# Patient Record
Sex: FEMALE | Race: WHITE | NOT HISPANIC OR LATINO | Employment: FULL TIME | URBAN - NONMETROPOLITAN AREA
[De-identification: names, ages, dates, MRNs, and addresses within clinical notes are randomized per-mention and may not be internally consistent; named-entity substitution may affect disease eponyms.]

---

## 2017-01-24 ENCOUNTER — OFFICE VISIT (OUTPATIENT)
Dept: FAMILY MEDICINE CLINIC | Facility: CLINIC | Age: 27
End: 2017-01-24

## 2017-01-24 VITALS
WEIGHT: 249 LBS | BODY MASS INDEX: 33.72 KG/M2 | OXYGEN SATURATION: 100 % | HEART RATE: 78 BPM | TEMPERATURE: 98.3 F | SYSTOLIC BLOOD PRESSURE: 138 MMHG | DIASTOLIC BLOOD PRESSURE: 62 MMHG | HEIGHT: 72 IN | RESPIRATION RATE: 16 BRPM

## 2017-01-24 DIAGNOSIS — R53.83 FATIGUE DUE TO DEPRESSION: ICD-10-CM

## 2017-01-24 DIAGNOSIS — F41.9 ANXIETY: ICD-10-CM

## 2017-01-24 DIAGNOSIS — F32.A FATIGUE DUE TO DEPRESSION: ICD-10-CM

## 2017-01-24 DIAGNOSIS — F33.2 SEVERE EPISODE OF RECURRENT MAJOR DEPRESSIVE DISORDER, WITHOUT PSYCHOTIC FEATURES (HCC): Primary | ICD-10-CM

## 2017-01-24 DIAGNOSIS — Z72.0 TOBACCO ABUSE: ICD-10-CM

## 2017-01-24 DIAGNOSIS — Z00.00 HEALTHCARE MAINTENANCE: ICD-10-CM

## 2017-01-24 PROCEDURE — 99204 OFFICE O/P NEW MOD 45 MIN: CPT | Performed by: INTERNAL MEDICINE

## 2017-01-24 RX ORDER — VENLAFAXINE HYDROCHLORIDE 75 MG/1
75 CAPSULE, EXTENDED RELEASE ORAL DAILY
Qty: 30 CAPSULE | Refills: 2 | Status: SHIPPED | OUTPATIENT
Start: 2017-01-24 | End: 2017-04-28

## 2017-01-24 RX ORDER — HYDROXYZINE HYDROCHLORIDE 25 MG/1
25 TABLET, FILM COATED ORAL EVERY 8 HOURS PRN
Qty: 60 TABLET | Refills: 0 | Status: SHIPPED | OUTPATIENT
Start: 2017-01-24 | End: 2017-03-22 | Stop reason: SDUPTHER

## 2017-01-24 NOTE — MR AVS SNAPSHOT
Linette Hanley   1/24/2017 1:15 PM   Office Visit    Dept Phone:  [phone]   Encounter #:  [id number]    Provider:  Lit Miranda DO   Department:  [facility] PRIMARY CARE                Your Full Care Plan              Today's Medication Changes          These changes are accurate as of: 1/24/17  2:10 PM.  If you have any questions, ask your nurse or doctor.               New Medication(s)Ordered:     hydrOXYzine 25 MG tablet   Commonly known as:  ATARAX   Take 1 tablet by mouth Every 8 (Eight) Hours As Needed for itching.   Started by:  Lit Miranda DO       venlafaxine XR 75 MG 24 hr capsule   Commonly known as:  EFFEXOR XR   Take 1 capsule by mouth Daily.   Started by:  Lit Miranda DO            Where to Get Your Medications      These medications were sent to [facility] [place] [address] - [address] [place] [address] - [phone]  - [phone] [address] [address]     Phone:  [phone]     hydrOXYzine 25 MG tablet    venlafaxine XR 75 MG 24 hr capsule                  Your Updated Medication List          This list is accurate as of: 1/24/17  2:10 PM.  Always use your most recent med list.                hydrOXYzine 25 MG tablet   Commonly known as:  ATARAX   Take 1 tablet by mouth Every 8 (Eight) Hours As Needed for itching.       venlafaxine XR 75 MG 24 hr capsule   Commonly known as:  EFFEXOR XR   Take 1 capsule by mouth Daily.               We Performed the Following     CBC & Differential     Comprehensive Metabolic Panel     Lipid Panel     TSH     Vitamin B12       You Were Diagnosed With        Codes Comments    Severe episode of recurrent major depressive disorder, without psychotic features    -  Primary ICD-10-CM: F33.2  ICD-9-CM: 296.33     Anxiety     ICD-10-CM: F41.9  ICD-9-CM: 300.00     Fatigue due to depression     ICD-10-CM: F32.9, R53.83  ICD-9-CM: 311, 780.79     Healthcare  maintenance     ICD-10-CM: Z00.00  ICD-9-CM: V70.0     Tobacco abuse     ICD-10-CM: Z72.0  ICD-9-CM: 305.1       Instructions    Major Depressive Disorder  Major depressive disorder is a mental illness. It also may be called clinical depression or unipolar depression. Major depressive disorder usually causes feelings of sadness, hopelessness, or helplessness. Some people with this disorder do not feel particularly sad but lose interest in doing things they used to enjoy (anhedonia). Major depressive disorder also can cause physical symptoms. It can interfere with work, school, relationships, and other normal everyday activities. The disorder varies in severity but is longer lasting and more serious than the sadness we all feel from time to time in our lives.  Major depressive disorder often is triggered by stressful life events or major life changes. Examples of these triggers include divorce, loss of your job or home, a move, and the death of a family member or close friend. Sometimes this disorder occurs for no obvious reason at all. People who have family members with major depressive disorder or bipolar disorder are at higher risk for developing this disorder, with or without life stressors. Major depressive disorder can occur at any age. It may occur just once in your life (single episode major depressive disorder). It may occur multiple times (recurrent major depressive disorder).  SYMPTOMS  People with major depressive disorder have either anhedonia or depressed mood on nearly a daily basis for at least 2 weeks or longer. Symptoms of depressed mood include:  · Feelings of sadness (blue or down in the dumps) or emptiness.  · Feelings of hopelessness or helplessness.  · Tearfulness or episodes of crying (may be observed by others).  · Irritability (children and adolescents).  In addition to depressed mood or anhedonia or both, people with this disorder have at least four of the following symptoms:  · Difficulty  sleeping or sleeping too much.    · Significant change (increase or decrease) in appetite or weight.    · Lack of energy or motivation.  · Feelings of guilt and worthlessness.    · Difficulty concentrating, remembering, or making decisions.  · Unusually slow movement (psychomotor retardation) or restlessness (as observed by others).    · Recurrent wishes for death, recurrent thoughts of self-harm (suicide), or a suicide attempt.  People with major depressive disorder commonly have persistent negative thoughts about themselves, other people, and the world. People with severe major depressive disorder may experience distorted beliefs or perceptions about the world (psychotic delusions). They also may see or hear things that are not real (psychotic hallucinations).  DIAGNOSIS  Major depressive disorder is diagnosed through an assessment by your health care provider. Your health care provider will ask about aspects of your daily life, such as mood, sleep, and appetite, to see if you have the diagnostic symptoms of major depressive disorder. Your health care provider may ask about your medical history and use of alcohol or drugs, including prescription medicines. Your health care provider also may do a physical exam and blood work. This is because certain medical conditions and the use of certain substances can cause major depressive disorder-like symptoms (secondary depression). Your health care provider also may refer you to a mental health specialist for further evaluation and treatment.  TREATMENT  It is important to recognize the symptoms of major depressive disorder and seek treatment. The following treatments can be prescribed for this disorder:    · Medicine. Antidepressant medicines usually are prescribed. Antidepressant medicines are thought to correct chemical imbalances in the brain that are commonly associated with major depressive disorder. Other types of medicine may be added if the symptoms do not respond  to antidepressant medicines alone or if psychotic delusions or hallucinations occur.  · Talk therapy. Talk therapy can be helpful in treating major depressive disorder by providing support, education, and guidance. Certain types of talk therapy also can help with negative thinking (cognitive behavioral therapy) and with relationship issues that trigger this disorder (interpersonal therapy).  A mental health specialist can help determine which treatment is best for you. Most people with major depressive disorder do well with a combination of medicine and talk therapy. Treatments involving electrical stimulation of the brain can be used in situations with extremely severe symptoms or when medicine and talk therapy do not work over time. These treatments include electroconvulsive therapy, transcranial magnetic stimulation, and vagal nerve stimulation.     This information is not intended to replace advice given to you by your health care provider. Make sure you discuss any questions you have with your health care provider.     Document Released: 2014 Document Revised: 2016 Document Reviewed: 2014  [employer] Interactive Patient Education © [employer]       Patient Instructions History      Upcoming Appointments     Visit Type Date Time Department    NEW PATIENT 2017  1:15 PM [facility]    INITIAL EVALUATION 2017  2:00 PM [facility]    OFFICE VISIT 2017  3:15 PM [facility]      B[employer] Signup     [facility] [employer] allows you to send messages to your doctor, view your test results, renew your prescriptions, schedule appointments, and more. To sign up, go to [email or web address] and click on the Sign Up Now link in the New User? box. Enter your [employer] Activation Code exactly as it appears below along with the last four digits of your Social Security Number and your Date of Birth () to complete the sign-up process. If you do not sign up before the  "expiration date, you must request a new code.    MyChart Activation Code: [email or web address]  Expires: 2/7/2017  2:08 PM    If you have questions, you can email [email or web address] or call [phone] to talk to our MyChart staff. Remember, MyChart is NOT to be used for urgent needs. For medical emergencies, dial 911.               Other Info from Your Visit           Your Appointments     Jan 31, 2017  2:00 PM EST   Initial Evaluation with Dorys Benitez, [facility] (--)    [address]  [facility] [address] [phone]           Bring all previous medical records and films, along with current medications and insurance information.            Feb 23, 2017  3:15 PM EST   Office Visit with Lit Miranda DO   [facility] (--)    [address]  [facility] [address] [phone]           Arrive 15 minutes prior to appointment.              Allergies     No Known Allergies      Reason for Visit     Establish Care Patient is here to establish care. Patient states she suffers from bipolar depression. She use to see therapist and take medication. Patient is fasting for labs.       Vital Signs     Blood Pressure Pulse Temperature Respirations Height Weight    138/62 (BP Location: Left arm, Patient Position: Sitting, Cuff Size: Adult) 78 98.3 °F (36.8 °C) (Oral) 16 72\" (182.9 cm) 249 lb (113 kg)    Oxygen Saturation Body Mass Index Smoking Status             100% 33.77 kg/m2 Current Every Day Smoker         Problems and Diagnoses Noted     Anxiety problem    Mood problem    Tobacco abuse    Fatigue due to depression        Routine medical exam            "

## 2017-01-24 NOTE — PROGRESS NOTES
Chief Complaint   Patient presents with   • Establish Care     Patient is here to establish care. Patient states she suffers from bipolar depression. She use to see therapist and take medication. Patient is fasting for labs.        Subjective     History of Present Illness   Linette Hanley is a 26 y.o. female with a history of bipolar, depression, tobacco abuse, and obesity who presents to establish care.  Patient states that she has recently had an emergency room visit for suicidal attempt and thoughts.  She was discharged from the emergency room after evaluation by psychology and was offered outpatient therapy.  Patient has an appointment with psychology but needs to establish with a primary care physician.  Patient shares that she used to be on anxiety medications in the past however she has been lost to medical care over the last 3-4 years.  She shares that her anxiety, depression, and behavior are worse recently.  She is currently living with her girlfriend who has been very supportive.    Patient admits to smoking a pack per day since since young teenage years.    The following portions of the patient's history were reviewed and updated as appropriate: allergies, current medications, past family history, past medical history, past social history, past surgical history and problem list.    Review of Systems   Constitutional: Negative for chills, fatigue and fever.   HENT: Negative for congestion, ear pain, rhinorrhea, sinus pressure and sore throat.    Eyes: Negative for visual disturbance.   Respiratory: Negative for cough, chest tightness, shortness of breath and wheezing.    Cardiovascular: Negative for chest pain, palpitations and leg swelling.   Gastrointestinal: Negative for abdominal pain, blood in stool, constipation, diarrhea, nausea and vomiting.   Endocrine: Negative for polydipsia and polyuria.   Genitourinary: Negative for dysuria and hematuria.   Musculoskeletal: Negative for back pain.    Skin: Negative for rash.   Neurological: Negative for dizziness, light-headedness, numbness and headaches.   Psychiatric/Behavioral: Positive for decreased concentration, dysphoric mood and sleep disturbance. Negative for self-injury and suicidal ideas. The patient is nervous/anxious.        No Known Allergies    Past Medical History   Diagnosis Date   • Asthma    • Bipolar depression    • Hypertension        Social History     Social History   • Marital status: Single     Spouse name: N/A   • Number of children: N/A   • Years of education: N/A     Occupational History   • Not on file.     Social History Main Topics   • Smoking status: Current Every Day Smoker   • Smokeless tobacco: Not on file   • Alcohol use No   • Drug use: No   • Sexual activity: Not on file     Other Topics Concern   • Not on file     Social History Narrative   • No narrative on file        No past surgical history on file.    Family History   Problem Relation Age of Onset   • Cancer Mother    • Hyperlipidemia Mother    • Hypertension Mother    • Obesity Mother    • Stroke Father    • Hypertension Father    • Heart disease Father    • Depression Sister    • Depression Brother    • Cancer Maternal Grandmother    • Hyperlipidemia Maternal Grandmother    • Hypertension Maternal Grandmother    • Obesity Maternal Grandmother    • Cancer Maternal Grandfather    • Hyperlipidemia Maternal Grandfather    • Hypertension Maternal Grandfather    • Obesity Maternal Grandfather    • Depression Sister    • Depression Sister    • Depression Brother    • Depression Brother          Current Outpatient Prescriptions:   •  hydrOXYzine (ATARAX) 25 MG tablet, Take 1 tablet by mouth Every 8 (Eight) Hours As Needed for itching., Disp: 60 tablet, Rfl: 0  •  venlafaxine XR (EFFEXOR XR) 75 MG 24 hr capsule, Take 1 capsule by mouth Daily., Disp: 30 capsule, Rfl: 2    Objective   Visit Vitals   • /62 (BP Location: Left arm, Patient Position: Sitting, Cuff Size:  "Adult)   • Pulse 78   • Temp 98.3 °F (36.8 °C) (Oral)   • Resp 16   • Ht 72\" (182.9 cm)   • Wt 249 lb (113 kg)   • SpO2 100%   • BMI 33.77 kg/m2       Physical Exam   Constitutional: She is oriented to person, place, and time. She appears well-nourished. No distress.   HENT:   Head: Atraumatic.   Right Ear: External ear normal.   Left Ear: External ear normal.   Eyes: Conjunctivae are normal. Right eye exhibits no discharge. Left eye exhibits no discharge.   Neck: Normal range of motion. Neck supple.   Cardiovascular: Normal rate and regular rhythm.    No murmur heard.  Pulmonary/Chest: Effort normal and breath sounds normal. She has no wheezes. She has no rales.   Abdominal: Soft. Bowel sounds are normal. She exhibits no distension. There is no tenderness.   Neurological: She is alert and oriented to person, place, and time.   Skin: No rash noted. She is not diaphoretic.   Numerous long scar from cuts in the past noted on her upper extremities, upper chest and back.   Psychiatric: She has a normal mood and affect.   Nursing note and vitals reviewed.      Assessment/Plan   Linette was seen today for [facility].    Diagnoses and all orders for this visit:    Severe episode of recurrent major depressive disorder, without psychotic features  -     venlafaxine XR (EFFEXOR XR) 75 MG 24 hr capsule; Take 1 capsule by mouth Daily.  -     TSH    Anxiety  -     hydrOXYzine (ATARAX) 25 MG tablet; Take 1 tablet by mouth Every 8 (Eight) Hours As Needed for itching.    Fatigue due to depression  -     CBC & Differential  -     TSH  -     Vitamin D 25 Hydroxy; Future  -     Vitamin B12    Healthcare maintenance  -     Comprehensive Metabolic Panel  -     Lipid Panel    Tobacco abuse        Discussion Summary:  26-year-old -American female presenting to establish care.    1.  Major depressive disorder  -Start Effexor,  risks and benefits of the medications were discussed.  Patient was agreeable to starting the medication " she understands that should she become suicidal then she should present to the emergency room immediately.  -Check TSH  -Recommended that the patient sees a psychologist to help with some of her symptoms.  She has had suicidal thoughts and actions in the past.  She has multiple scars on her back, chest, and arms from suicidal attempts.    2.  Fatigue -check labs per above.    3.  Tobacco abuse-we will address further when anxiety and depression are better control.  Follow up:  Return in about 1 month (around 2/24/2017) for Next scheduled follow up.     Patient Instructions:  Patient instructions were provided.

## 2017-01-24 NOTE — PATIENT INSTRUCTIONS
Major Depressive Disorder  Major depressive disorder is a mental illness. It also may be called clinical depression or unipolar depression. Major depressive disorder usually causes feelings of sadness, hopelessness, or helplessness. Some people with this disorder do not feel particularly sad but lose interest in doing things they used to enjoy (anhedonia). Major depressive disorder also can cause physical symptoms. It can interfere with work, school, relationships, and other normal everyday activities. The disorder varies in severity but is longer lasting and more serious than the sadness we all feel from time to time in our lives.  Major depressive disorder often is triggered by stressful life events or major life changes. Examples of these triggers include divorce, loss of your job or home, a move, and the death of a family member or close friend. Sometimes this disorder occurs for no obvious reason at all. People who have family members with major depressive disorder or bipolar disorder are at higher risk for developing this disorder, with or without life stressors. Major depressive disorder can occur at any age. It may occur just once in your life (single episode major depressive disorder). It may occur multiple times (recurrent major depressive disorder).  SYMPTOMS  People with major depressive disorder have either anhedonia or depressed mood on nearly a daily basis for at least 2 weeks or longer. Symptoms of depressed mood include:  · Feelings of sadness (blue or down in the dumps) or emptiness.  · Feelings of hopelessness or helplessness.  · Tearfulness or episodes of crying (may be observed by others).  · Irritability (children and adolescents).  In addition to depressed mood or anhedonia or both, people with this disorder have at least four of the following symptoms:  · Difficulty sleeping or sleeping too much.    · Significant change (increase or decrease) in appetite or weight.    · Lack of energy or  motivation.  · Feelings of guilt and worthlessness.    · Difficulty concentrating, remembering, or making decisions.  · Unusually slow movement (psychomotor retardation) or restlessness (as observed by others).    · Recurrent wishes for death, recurrent thoughts of self-harm (suicide), or a suicide attempt.  People with major depressive disorder commonly have persistent negative thoughts about themselves, other people, and the world. People with severe major depressive disorder may experience distorted beliefs or perceptions about the world (psychotic delusions). They also may see or hear things that are not real (psychotic hallucinations).  DIAGNOSIS  Major depressive disorder is diagnosed through an assessment by your health care provider. Your health care provider will ask about aspects of your daily life, such as mood, sleep, and appetite, to see if you have the diagnostic symptoms of major depressive disorder. Your health care provider may ask about your medical history and use of alcohol or drugs, including prescription medicines. Your health care provider also may do a physical exam and blood work. This is because certain medical conditions and the use of certain substances can cause major depressive disorder-like symptoms (secondary depression). Your health care provider also may refer you to a mental health specialist for further evaluation and treatment.  TREATMENT  It is important to recognize the symptoms of major depressive disorder and seek treatment. The following treatments can be prescribed for this disorder:    · Medicine. Antidepressant medicines usually are prescribed. Antidepressant medicines are thought to correct chemical imbalances in the brain that are commonly associated with major depressive disorder. Other types of medicine may be added if the symptoms do not respond to antidepressant medicines alone or if psychotic delusions or hallucinations occur.  · Talk therapy. Talk therapy can be  helpful in treating major depressive disorder by providing support, education, and guidance. Certain types of talk therapy also can help with negative thinking (cognitive behavioral therapy) and with relationship issues that trigger this disorder (interpersonal therapy).  A mental health specialist can help determine which treatment is best for you. Most people with major depressive disorder do well with a combination of medicine and talk therapy. Treatments involving electrical stimulation of the brain can be used in situations with extremely severe symptoms or when medicine and talk therapy do not work over time. These treatments include electroconvulsive therapy, transcranial magnetic stimulation, and vagal nerve stimulation.     This information is not intended to replace advice given to you by your health care provider. Make sure you discuss any questions you have with your health care provider.     Document Released: 04/14/2014 Document Revised: 01/08/2016 Document Reviewed: 04/14/2014  [employer] Interactive Patient Education ©2016 [employer].

## 2017-01-27 ENCOUNTER — OFFICE VISIT (OUTPATIENT)
Dept: FAMILY MEDICINE CLINIC | Facility: CLINIC | Age: 27
End: 2017-01-27

## 2017-01-27 VITALS
HEART RATE: 84 BPM | HEIGHT: 72 IN | SYSTOLIC BLOOD PRESSURE: 129 MMHG | TEMPERATURE: 98.3 F | WEIGHT: 239 LBS | OXYGEN SATURATION: 99 % | DIASTOLIC BLOOD PRESSURE: 92 MMHG | RESPIRATION RATE: 14 BRPM | BODY MASS INDEX: 32.37 KG/M2

## 2017-01-27 DIAGNOSIS — H65.91 OTHER NONSUPPURATIVE OTITIS MEDIA OF RIGHT EAR, UNSPECIFIED CHRONICITY: Primary | ICD-10-CM

## 2017-01-27 PROCEDURE — 99213 OFFICE O/P EST LOW 20 MIN: CPT | Performed by: INTERNAL MEDICINE

## 2017-01-27 RX ORDER — AMOXICILLIN 500 MG/1
500 CAPSULE ORAL 2 TIMES DAILY
Qty: 14 CAPSULE | Refills: 0 | Status: SHIPPED | OUTPATIENT
Start: 2017-01-27 | End: 2017-03-22

## 2017-01-27 NOTE — MR AVS SNAPSHOT
Linette Hanley   1/27/2017 1:45 PM   Office Visit    Dept Phone:  [phone]   Encounter #:  [id number]    Provider:  Lit Miranda DO   Department:  [facility] PRIMARY CARE                Your Full Care Plan              Today's Medication Changes          These changes are accurate as of: 1/27/17  2:39 PM.  If you have any questions, ask your nurse or doctor.               New Medication(s)Ordered:     amoxicillin 500 MG capsule   Commonly known as:  AMOXIL   Take 1 capsule by mouth 2 (Two) Times a Day.            Where to Get Your Medications      These medications were sent to [facility] [place] [address] [address] - [phone]  - [phone] [address] [address]     Phone:  [phone]     amoxicillin 500 MG capsule                  Your Updated Medication List          This list is accurate as of: 1/27/17  2:39 PM.  Always use your most recent med list.                amoxicillin 500 MG capsule   Commonly known as:  AMOXIL   Take 1 capsule by mouth 2 (Two) Times a Day.       hydrOXYzine 25 MG tablet   Commonly known as:  ATARAX   Take 1 tablet by mouth Every 8 (Eight) Hours As Needed for itching.       venlafaxine XR 75 MG 24 hr capsule   Commonly known as:  EFFEXOR XR   Take 1 capsule by mouth Daily.               You Were Diagnosed With        Codes Comments    Other nonsuppurative otitis media of right ear, unspecified chronicity    -  Primary ICD-10-CM: H65.91  ICD-9-CM: 381.4       Instructions     None    Patient Instructions History      Upcoming Appointments     Visit Type Date Time Department    OFFICE VISIT 1/27/2017  1:45 PM [facility]    INITIAL EVALUATION 1/31/2017  2:00 PM [facility]    OFFICE VISIT 2/23/2017  3:15 PM [facility]      MyChart Signup     [facility] allows you to send messages to your doctor, view your test results, renew your  "prescriptions, schedule appointments, and more. To sign up, go to [employer] and click on the Sign Up Now link in the New User? box. Enter your mig33 Activation Code exactly as it appears below along with the last four digits of your Social Security Number and your Date of Birth () to complete the sign-up process. If you do not sign up before the expiration date, you must request a new code.    [email or web address] Activation Code: [email or web address]  Expires: 2017  2:08 PM    If you have questions, you can email [email or web address] or call [phone] to talk to our [email or web address] staff. Remember, mig33 is NOT to be used for urgent needs. For medical emergencies, dial 911.               Other Info from Your Visit           Your Appointments     2017  2:00 PM EST   Initial Evaluation with [facility] (--)    [address]  [facility] [address] [phone]           Bring all previous medical records and films, along with current medications and insurance information.            2017  3:15 PM EST   Office Visit with Lit Miranda DO   [facility] PRIMARY [facility] (--)    [address]  [facility] [address] [phone]           Arrive 15 minutes prior to appointment.              Allergies     No Known Allergies      Reason for Visit     Follow-up Patient is here to be seen for right ear pain, diarrhea, and nasal congestion x 2 days     Earache Right ear pain      Vital Signs     Blood Pressure Pulse Temperature Respirations Height Weight    129/92 (BP Location: Right arm, Patient Position: Sitting, Cuff Size: Large Adult) 84 98.3 °F (36.8 °C) (Oral) 14 72\" (182.9 cm) 239 lb (108 kg)    Oxygen Saturation Body Mass Index Smoking Status             99% 32.41 kg/m2 Current Every Day Smoker         Problems and Diagnoses Noted     Other nonsuppurative otitis media of right " ear, unspecified chronicity    -  Primary

## 2017-01-27 NOTE — LETTER
January 27, 2017     Patient: Linette Hanley   YOB: 1990   Date of Visit: 1/27/2017       To Whom It May Concern:    It is my medical opinion that Linette Hanley may return to work in two days.         Sincerely,        Lit Miranda DO    CC: No Recipients

## 2017-01-27 NOTE — PROGRESS NOTES
Chief Complaint   Patient presents with   • Follow-up     Patient is here to be seen for right ear pain, diarrhea, and nasal congestion x 2 days    • Earache     Right ear pain       Subjective     History of Present Illness   Linette Hanley is a 26 y.o. female presenting with right ear pain x 2-3 days with associated chills, congestion, and generalized weakness.     The following portions of the patient's history were reviewed and updated as appropriate: allergies, current medications, past family history, past medical history, past social history, past surgical history and problem list.    Review of Systems    No Known Allergies    Past Medical History   Diagnosis Date   • Asthma    • Bipolar depression    • Hypertension        Social History     Social History   • Marital status: Single     Spouse name: N/A   • Number of children: N/A   • Years of education: N/A     Occupational History   • Not on file.     Social History Main Topics   • Smoking status: Current Every Day Smoker   • Smokeless tobacco: Not on file   • Alcohol use No   • Drug use: No   • Sexual activity: Not on file     Other Topics Concern   • Not on file     Social History Narrative   • No narrative on file        No past surgical history on file.    Family History   Problem Relation Age of Onset   • Cancer Mother    • Hyperlipidemia Mother    • Hypertension Mother    • Obesity Mother    • Stroke Father    • Hypertension Father    • Heart disease Father    • Depression Sister    • Depression Brother    • Cancer Maternal Grandmother    • Hyperlipidemia Maternal Grandmother    • Hypertension Maternal Grandmother    • Obesity Maternal Grandmother    • Cancer Maternal Grandfather    • Hyperlipidemia Maternal Grandfather    • Hypertension Maternal Grandfather    • Obesity Maternal Grandfather    • Depression Sister    • Depression Sister    • Depression Brother    • Depression Brother          Current Outpatient Prescriptions:   •  hydrOXYzine  "(ATARAX) 25 MG tablet, Take 1 tablet by mouth Every 8 (Eight) Hours As Needed for itching., Disp: 60 tablet, Rfl: 0  •  venlafaxine XR (EFFEXOR XR) 75 MG 24 hr capsule, Take 1 capsule by mouth Daily., Disp: 30 capsule, Rfl: 2  •  amoxicillin (AMOXIL) 500 MG capsule, Take 1 capsule by mouth 2 (Two) Times a Day., Disp: 14 capsule, Rfl: 0    Objective   Visit Vitals   • /92 (BP Location: Right arm, Patient Position: Sitting, Cuff Size: Large Adult)   • Pulse 84   • Temp 98.3 °F (36.8 °C) (Oral)   • Resp 14   • Ht 72\" (182.9 cm)   • Wt 239 lb (108 kg)   • SpO2 99%   • BMI 32.41 kg/m2       Physical Exam   Constitutional: She is oriented to person, place, and time. She appears well-developed and well-nourished.   HENT:   Head: Normocephalic and atraumatic.   Mild erythema in throat, Right ear with inflammation of TM and fibrous deformity.   Eyes: Conjunctivae are normal.   Pulmonary/Chest: Effort normal.   Musculoskeletal: Normal range of motion.   Neurological: She is alert and oriented to person, place, and time.   Psychiatric: She has a normal mood and affect. Her behavior is normal.   Nursing note and vitals reviewed.      Assessment/Plan   Linette was seen today for follow-up and earache.    Diagnoses and all orders for this visit:    Other nonsuppurative otitis media of right ear, unspecified chronicity  -     amoxicillin (AMOXIL) 500 MG capsule; Take 1 capsule by mouth 2 (Two) Times a Day.          Discussion Summary:    25 yo AA F  Presenting with right otitis media.  Start amoxicillin.  OTC conservative therapies recommended.  Pt given letter for 2 days off work.    Follow up:  Return if symptoms worsen or fail to improve.     Patient Instructions:  Patient instructions were provided.  "

## 2017-02-24 ENCOUNTER — APPOINTMENT (OUTPATIENT)
Dept: GENERAL RADIOLOGY | Facility: HOSPITAL | Age: 27
End: 2017-02-24

## 2017-02-24 ENCOUNTER — HOSPITAL ENCOUNTER (EMERGENCY)
Facility: HOSPITAL | Age: 27
Discharge: HOME | End: 2017-02-25
Attending: EMERGENCY MEDICINE | Admitting: EMERGENCY MEDICINE

## 2017-02-24 DIAGNOSIS — S39.012A LUMBAR STRAIN, INITIAL ENCOUNTER: Primary | ICD-10-CM

## 2017-02-24 PROCEDURE — 81001 URINALYSIS AUTO W/SCOPE: CPT | Performed by: EMERGENCY MEDICINE

## 2017-02-24 PROCEDURE — 99283 EMERGENCY DEPT VISIT LOW MDM: CPT

## 2017-02-24 PROCEDURE — 25010000002 KETOROLAC TROMETHAMINE PER 15 MG: Performed by: EMERGENCY MEDICINE

## 2017-02-24 PROCEDURE — 96372 THER/PROPH/DIAG INJ SC/IM: CPT

## 2017-02-24 PROCEDURE — 72110 X-RAY EXAM L-2 SPINE 4/>VWS: CPT

## 2017-02-24 RX ORDER — KETOROLAC TROMETHAMINE 30 MG/ML
60 INJECTION, SOLUTION INTRAMUSCULAR; INTRAVENOUS ONCE
Status: COMPLETED | OUTPATIENT
Start: 2017-02-24 | End: 2017-02-24

## 2017-02-24 RX ADMIN — KETOROLAC TROMETHAMINE 60 MG: 30 INJECTION, SOLUTION INTRAMUSCULAR at 23:54

## 2017-02-25 VITALS
HEIGHT: 72 IN | WEIGHT: 245 LBS | HEART RATE: 91 BPM | BODY MASS INDEX: 33.18 KG/M2 | RESPIRATION RATE: 18 BRPM | OXYGEN SATURATION: 100 % | SYSTOLIC BLOOD PRESSURE: 116 MMHG | DIASTOLIC BLOOD PRESSURE: 63 MMHG | TEMPERATURE: 98.7 F

## 2017-02-25 LAB
BACTERIA UR QL AUTO: ABNORMAL /HPF
BILIRUB UR QL STRIP: NEGATIVE
CLARITY UR: CLEAR
COLOR UR: YELLOW
GLUCOSE UR STRIP-MCNC: NEGATIVE MG/DL
HGB UR QL STRIP.AUTO: NEGATIVE
HYALINE CASTS UR QL AUTO: ABNORMAL /LPF
KETONES UR QL STRIP: NEGATIVE
LEUKOCYTE ESTERASE UR QL STRIP.AUTO: ABNORMAL
NITRITE UR QL STRIP: NEGATIVE
PH UR STRIP.AUTO: 5.5 [PH] (ref 5–8)
PROT UR QL STRIP: NEGATIVE
RBC # UR: ABNORMAL /HPF
REF LAB TEST METHOD: ABNORMAL
SP GR UR STRIP: 1.01 (ref 1–1.03)
SQUAMOUS #/AREA URNS HPF: ABNORMAL /HPF
UROBILINOGEN UR QL STRIP: ABNORMAL
WBC UR QL AUTO: ABNORMAL /HPF

## 2017-02-25 RX ORDER — IBUPROFEN 600 MG/1
600 TABLET ORAL EVERY 6 HOURS PRN
Qty: 28 TABLET | Refills: 0 | Status: SHIPPED | OUTPATIENT
Start: 2017-02-25 | End: 2019-11-13

## 2017-03-22 ENCOUNTER — OFFICE VISIT (OUTPATIENT)
Dept: FAMILY MEDICINE CLINIC | Facility: CLINIC | Age: 27
End: 2017-03-22

## 2017-03-22 VITALS
HEART RATE: 94 BPM | TEMPERATURE: 98 F | DIASTOLIC BLOOD PRESSURE: 62 MMHG | OXYGEN SATURATION: 100 % | WEIGHT: 241 LBS | SYSTOLIC BLOOD PRESSURE: 130 MMHG | HEIGHT: 72 IN | RESPIRATION RATE: 16 BRPM | BODY MASS INDEX: 32.64 KG/M2

## 2017-03-22 DIAGNOSIS — F41.9 ANXIETY: ICD-10-CM

## 2017-03-22 DIAGNOSIS — F33.2 SEVERE EPISODE OF RECURRENT MAJOR DEPRESSIVE DISORDER, WITHOUT PSYCHOTIC FEATURES (HCC): Primary | ICD-10-CM

## 2017-03-22 PROCEDURE — 99214 OFFICE O/P EST MOD 30 MIN: CPT | Performed by: INTERNAL MEDICINE

## 2017-03-22 RX ORDER — HYDROXYZINE HYDROCHLORIDE 25 MG/1
25 TABLET, FILM COATED ORAL EVERY 8 HOURS PRN
Qty: 60 TABLET | Refills: 2 | Status: SHIPPED | OUTPATIENT
Start: 2017-03-22 | End: 2017-07-12 | Stop reason: SDUPTHER

## 2017-03-22 RX ORDER — BUPROPION HYDROCHLORIDE 150 MG/1
150 TABLET ORAL DAILY
Qty: 30 TABLET | Refills: 2 | Status: SHIPPED | OUTPATIENT
Start: 2017-03-22 | End: 2017-04-28 | Stop reason: SDUPTHER

## 2017-03-22 NOTE — PROGRESS NOTES
Chief Complaint   Patient presents with   • Follow-up     Patient is here to follow up for anxiety and depression. She states she can not tell that the medication is helping her. Patient states she has been picking her skin and have nightmares.        Subjective     History of Present Illness   Linette Hanley is a 26 y.o. female with h/o depression and anxiety presenting for follow up on medical problems.  She feels her meds have not made a major difference.  She has been picking at her finger nails habitually more often.  She still has anger outbursts.  She is not suicidal at this time.      The following portions of the patient's history were reviewed and updated as appropriate: allergies, current medications, past family history, past medical history, past social history, past surgical history and problem list.    Review of Systems   Constitutional: Negative for chills, fatigue and fever.   HENT: Negative for congestion, ear pain, rhinorrhea, sinus pressure and sore throat.    Eyes: Negative for visual disturbance.   Respiratory: Negative for cough, chest tightness, shortness of breath and wheezing.    Cardiovascular: Negative for chest pain, palpitations and leg swelling.   Gastrointestinal: Negative for abdominal pain, blood in stool, constipation, diarrhea, nausea and vomiting.   Endocrine: Negative for polydipsia and polyuria.   Genitourinary: Negative for dysuria and hematuria.   Musculoskeletal: Negative for back pain.   Skin: Negative for rash.   Neurological: Negative for dizziness, light-headedness, numbness and headaches.   Psychiatric/Behavioral: Negative for dysphoric mood and sleep disturbance. The patient is not nervous/anxious.        No Known Allergies    Past Medical History:   Diagnosis Date   • Asthma    • Bipolar depression    • Hypertension        Social History     Social History   • Marital status: Single     Spouse name: N/A   • Number of children: N/A   • Years of education: N/A  "    Occupational History   • Not on file.     Social History Main Topics   • Smoking status: Current Every Day Smoker   • Smokeless tobacco: Not on file   • Alcohol use No   • Drug use: No   • Sexual activity: Not on file     Other Topics Concern   • Not on file     Social History Narrative        No past surgical history on file.    Family History   Problem Relation Age of Onset   • Cancer Mother    • Hyperlipidemia Mother    • Hypertension Mother    • Obesity Mother    • Stroke Father    • Hypertension Father    • Heart disease Father    • Depression Sister    • Depression Brother    • Cancer Maternal Grandmother    • Hyperlipidemia Maternal Grandmother    • Hypertension Maternal Grandmother    • Obesity Maternal Grandmother    • Cancer Maternal Grandfather    • Hyperlipidemia Maternal Grandfather    • Hypertension Maternal Grandfather    • Obesity Maternal Grandfather    • Depression Sister    • Depression Sister    • Depression Brother    • Depression Brother          Current Outpatient Prescriptions:   •  hydrOXYzine (ATARAX) 25 MG tablet, Take 1 tablet by mouth Every 8 (Eight) Hours As Needed for Itching., Disp: 60 tablet, Rfl: 2  •  ibuprofen (ADVIL,MOTRIN) 600 MG tablet, Take 1 tablet by mouth Every 6 (Six) Hours As Needed for mild pain (1-3)., Disp: 28 tablet, Rfl: 0  •  venlafaxine XR (EFFEXOR XR) 75 MG 24 hr capsule, Take 1 capsule by mouth Daily., Disp: 30 capsule, Rfl: 2  •  buPROPion XL (WELLBUTRIN XL) 150 MG 24 hr tablet, Take 1 tablet by mouth Daily., Disp: 30 tablet, Rfl: 2    Objective   /62 (BP Location: Right arm, Patient Position: Sitting, Cuff Size: Adult)  Pulse 94  Temp 98 °F (36.7 °C) (Oral)   Resp 16  Ht 72\" (182.9 cm)  Wt 241 lb (109 kg)  LMP 01/25/2017  SpO2 100%  BMI 32.69 kg/m2    Physical Exam   Constitutional: She is oriented to person, place, and time. She appears well-developed and well-nourished.   HENT:   Head: Normocephalic and atraumatic.   Eyes: Conjunctivae are " normal.   Pulmonary/Chest: Effort normal.   Musculoskeletal: Normal range of motion.   Neurological: She is alert and oriented to person, place, and time.   Psychiatric: She has a normal mood and affect. Her behavior is normal.   Nursing note and vitals reviewed.      Assessment/Plan   Linette was seen today for follow-up.    Diagnoses and all orders for this visit:    Severe episode of recurrent major depressive disorder, without psychotic features  -     buPROPion XL (WELLBUTRIN XL) 150 MG 24 hr tablet; Take 1 tablet by mouth Daily.    Anxiety  -     buPROPion XL (WELLBUTRIN XL) 150 MG 24 hr tablet; Take 1 tablet by mouth Daily.  -     hydrOXYzine (ATARAX) 25 MG tablet; Take 1 tablet by mouth Every 8 (Eight) Hours As Needed for Itching.        Discussion Summary:  27 yo W F presenting for follow up on anxiety/depression  Patient to wean off Effexor over 1 week and start Wellbutrin for mood.  Hydroxyzine for anxiety attacks.  Patient advised to see counselor which she hesitated with over the last month.  Extensive time was spent with counseling and encouraging the patient to think more positively with life.      Follow up:  Return in about 1 month (around 4/22/2017) for Next scheduled follow up.     Patient Instructions:  Patient instructions were provided.

## 2017-03-22 NOTE — PATIENT INSTRUCTIONS
Major Depressive Disorder  Major depressive disorder is a mental illness. It also may be called clinical depression or unipolar depression. Major depressive disorder usually causes feelings of sadness, hopelessness, or helplessness. Some people with this disorder do not feel particularly sad but lose interest in doing things they used to enjoy (anhedonia). Major depressive disorder also can cause physical symptoms. It can interfere with work, school, relationships, and other normal everyday activities. The disorder varies in severity but is longer lasting and more serious than the sadness we all feel from time to time in our lives.  Major depressive disorder often is triggered by stressful life events or major life changes. Examples of these triggers include divorce, loss of your job or home, a move, and the death of a family member or close friend. Sometimes this disorder occurs for no obvious reason at all. People who have family members with major depressive disorder or bipolar disorder are at higher risk for developing this disorder, with or without life stressors. Major depressive disorder can occur at any age. It may occur just once in your life (single episode major depressive disorder). It may occur multiple times (recurrent major depressive disorder).  SYMPTOMS  People with major depressive disorder have either anhedonia or depressed mood on nearly a daily basis for at least 2 weeks or longer. Symptoms of depressed mood include:  · Feelings of sadness (blue or down in the dumps) or emptiness.  · Feelings of hopelessness or helplessness.  · Tearfulness or episodes of crying (may be observed by others).  · Irritability (children and adolescents).  In addition to depressed mood or anhedonia or both, people with this disorder have at least four of the following symptoms:  · Difficulty sleeping or sleeping too much.    · Significant change (increase or decrease) in appetite or weight.    · Lack of energy or  motivation.  · Feelings of guilt and worthlessness.    · Difficulty concentrating, remembering, or making decisions.  · Unusually slow movement (psychomotor retardation) or restlessness (as observed by others).    · Recurrent wishes for death, recurrent thoughts of self-harm (suicide), or a suicide attempt.  People with major depressive disorder commonly have persistent negative thoughts about themselves, other people, and the world. People with severe major depressive disorder may experience distorted beliefs or perceptions about the world (psychotic delusions). They also may see or hear things that are not real (psychotic hallucinations).  DIAGNOSIS  Major depressive disorder is diagnosed through an assessment by your health care provider. Your health care provider will ask about aspects of your daily life, such as mood, sleep, and appetite, to see if you have the diagnostic symptoms of major depressive disorder. Your health care provider may ask about your medical history and use of alcohol or drugs, including prescription medicines. Your health care provider also may do a physical exam and blood work. This is because certain medical conditions and the use of certain substances can cause major depressive disorder-like symptoms (secondary depression). Your health care provider also may refer you to a mental health specialist for further evaluation and treatment.  TREATMENT  It is important to recognize the symptoms of major depressive disorder and seek treatment. The following treatments can be prescribed for this disorder:    · Medicine. Antidepressant medicines usually are prescribed. Antidepressant medicines are thought to correct chemical imbalances in the brain that are commonly associated with major depressive disorder. Other types of medicine may be added if the symptoms do not respond to antidepressant medicines alone or if psychotic delusions or hallucinations occur.  · Talk therapy. Talk therapy can be  helpful in treating major depressive disorder by providing support, education, and guidance. Certain types of talk therapy also can help with negative thinking (cognitive behavioral therapy) and with relationship issues that trigger this disorder (interpersonal therapy).  A mental health specialist can help determine which treatment is best for you. Most people with major depressive disorder do well with a combination of medicine and talk therapy. Treatments involving electrical stimulation of the brain can be used in situations with extremely severe symptoms or when medicine and talk therapy do not work over time. These treatments include electroconvulsive therapy, transcranial magnetic stimulation, and vagal nerve stimulation.     This information is not intended to replace advice given to you by your health care provider. Make sure you discuss any questions you have with your health care provider.     Document Released: 04/14/2014 Document Revised: 01/08/2016 Document Reviewed: 04/14/2014  [employer] Interactive Patient Education ©2016 [employer]

## 2017-04-15 DIAGNOSIS — H65.91 OTHER NONSUPPURATIVE OTITIS MEDIA OF RIGHT EAR, UNSPECIFIED CHRONICITY: ICD-10-CM

## 2017-04-18 RX ORDER — AMOXICILLIN 500 MG/1
CAPSULE ORAL
Qty: 14 CAPSULE | Refills: 0 | OUTPATIENT
Start: 2017-04-18

## 2017-04-28 ENCOUNTER — OFFICE VISIT (OUTPATIENT)
Dept: FAMILY MEDICINE CLINIC | Facility: CLINIC | Age: 27
End: 2017-04-28

## 2017-04-28 VITALS
WEIGHT: 244 LBS | RESPIRATION RATE: 15 BRPM | BODY MASS INDEX: 33.05 KG/M2 | HEART RATE: 65 BPM | DIASTOLIC BLOOD PRESSURE: 86 MMHG | HEIGHT: 72 IN | SYSTOLIC BLOOD PRESSURE: 130 MMHG | OXYGEN SATURATION: 100 %

## 2017-04-28 DIAGNOSIS — F33.2 SEVERE EPISODE OF RECURRENT MAJOR DEPRESSIVE DISORDER, WITHOUT PSYCHOTIC FEATURES (HCC): ICD-10-CM

## 2017-04-28 DIAGNOSIS — S33.5XXD LOW BACK SPRAIN, SUBSEQUENT ENCOUNTER: Primary | ICD-10-CM

## 2017-04-28 DIAGNOSIS — F41.9 ANXIETY: ICD-10-CM

## 2017-04-28 PROCEDURE — 99213 OFFICE O/P EST LOW 20 MIN: CPT | Performed by: INTERNAL MEDICINE

## 2017-04-28 RX ORDER — CYCLOBENZAPRINE HCL 10 MG
TABLET ORAL
COMMUNITY
Start: 2017-02-27 | End: 2017-04-28 | Stop reason: SDUPTHER

## 2017-04-28 RX ORDER — CYCLOBENZAPRINE HCL 10 MG
10 TABLET ORAL 2 TIMES DAILY PRN
Qty: 30 TABLET | Refills: 2 | Status: SHIPPED | OUTPATIENT
Start: 2017-04-28 | End: 2017-08-28 | Stop reason: SDUPTHER

## 2017-04-28 RX ORDER — DICLOFENAC SODIUM 75 MG/1
TABLET, DELAYED RELEASE ORAL
COMMUNITY
Start: 2017-02-27 | End: 2017-04-28 | Stop reason: ALTCHOICE

## 2017-04-28 RX ORDER — BUPROPION HYDROCHLORIDE 300 MG/1
300 TABLET ORAL DAILY
Qty: 30 TABLET | Refills: 5 | Status: SHIPPED | OUTPATIENT
Start: 2017-04-28 | End: 2017-10-17 | Stop reason: SDUPTHER

## 2017-04-28 NOTE — PROGRESS NOTES
Chief Complaint   Patient presents with   • Follow-up     Patient is here to follow up for anxiety and depression. Patient states she can not get in to see a Therapist until July and does not feel she can wait that long.        Subjective     History of Present Illness   Linette Hanley is a 26 y.o. female presenting for follow-up on anxiety and major depression.  Patient states that Wellbutrin seems to have helped with some of her symptoms however she continues to have fairly frequent anxiety and panic attacks.  She has tried to contact psychiatry clinics and her next appointment is unfortunately several months from now.  She is requesting my assistance for obtaining an earlier appointment.  She is not actively suicidal at this time however she is very motivated to get help for her psychiatric problems.    The following portions of the patient's history were reviewed and updated as appropriate: allergies, current medications, past family history, past medical history, past social history, past surgical history and problem list.    Review of Systems   Constitutional: Negative for chills, fatigue and fever.   HENT: Negative for congestion, ear pain, rhinorrhea, sinus pressure and sore throat.    Eyes: Negative for visual disturbance.   Respiratory: Negative for cough, chest tightness, shortness of breath and wheezing.    Cardiovascular: Negative for chest pain, palpitations and leg swelling.   Gastrointestinal: Negative for abdominal pain, blood in stool, constipation, diarrhea, nausea and vomiting.   Endocrine: Negative for polydipsia and polyuria.   Genitourinary: Negative for dysuria and hematuria.   Musculoskeletal: Negative for back pain.   Skin: Negative for rash.   Neurological: Negative for dizziness, light-headedness, numbness and headaches.   Psychiatric/Behavioral: Positive for agitation and dysphoric mood. Negative for sleep disturbance and suicidal ideas. The patient is nervous/anxious.        No  "Known Allergies    Past Medical History:   Diagnosis Date   • Asthma    • Bipolar depression    • Hypertension        Social History     Social History   • Marital status: Single     Spouse name: N/A   • Number of children: N/A   • Years of education: N/A     Occupational History   • Not on file.     Social History Main Topics   • Smoking status: Current Every Day Smoker   • Smokeless tobacco: Not on file   • Alcohol use No   • Drug use: No   • Sexual activity: Not on file     Other Topics Concern   • Not on file     Social History Narrative        No past surgical history on file.    Family History   Problem Relation Age of Onset   • Cancer Mother    • Hyperlipidemia Mother    • Hypertension Mother    • Obesity Mother    • Stroke Father    • Hypertension Father    • Heart disease Father    • Depression Sister    • Depression Brother    • Cancer Maternal Grandmother    • Hyperlipidemia Maternal Grandmother    • Hypertension Maternal Grandmother    • Obesity Maternal Grandmother    • Cancer Maternal Grandfather    • Hyperlipidemia Maternal Grandfather    • Hypertension Maternal Grandfather    • Obesity Maternal Grandfather    • Depression Sister    • Depression Sister    • Depression Brother    • Depression Brother          Current Outpatient Prescriptions:   •  buPROPion XL (WELLBUTRIN XL) 300 MG 24 hr tablet, Take 1 tablet by mouth Daily., Disp: 30 tablet, Rfl: 5  •  hydrOXYzine (ATARAX) 25 MG tablet, Take 1 tablet by mouth Every 8 (Eight) Hours As Needed for Itching., Disp: 60 tablet, Rfl: 2  •  ibuprofen (ADVIL,MOTRIN) 600 MG tablet, Take 1 tablet by mouth Every 6 (Six) Hours As Needed for mild pain (1-3)., Disp: 28 tablet, Rfl: 0  •  cyclobenzaprine (FLEXERIL) 10 MG tablet, Take 1 tablet by mouth 2 (Two) Times a Day As Needed for Muscle Spasms., Disp: 30 tablet, Rfl: 2    Objective   /86 (BP Location: Right arm, Patient Position: Sitting, Cuff Size: Adult)  Pulse 65  Resp 15  Ht 72\" (182.9 cm)  Wt 244 " lb (111 kg)  SpO2 100%  BMI 33.09 kg/m2    Physical Exam   Constitutional: She is oriented to person, place, and time. She appears well-developed and well-nourished.   HENT:   Head: Normocephalic and atraumatic.   Eyes: Conjunctivae are normal.   Pulmonary/Chest: Effort normal.   Musculoskeletal: Normal range of motion.   Neurological: She is alert and oriented to person, place, and time.   Psychiatric: She has a normal mood and affect. Her behavior is normal.   Nursing note and vitals reviewed.      Assessment/Plan   Linette was seen today for follow-up.    Diagnoses and all orders for this visit:    Low back sprain, subsequent encounter  -     cyclobenzaprine (FLEXERIL) 10 MG tablet; Take 1 tablet by mouth 2 (Two) Times a Day As Needed for Muscle Spasms.    Anxiety  -     buPROPion XL (WELLBUTRIN XL) 300 MG 24 hr tablet; Take 1 tablet by mouth Daily.    Severe episode of recurrent major depressive disorder, without psychotic features  -     buPROPion XL (WELLBUTRIN XL) 300 MG 24 hr tablet; Take 1 tablet by mouth Daily.        Discussion Summary:    26-year-old white female presenting for follow-up on major depressive disorder and anxiety.  Wellbutrin dose was increased to 300 mg daily.  I contacted the psychiatry clinic to ensure that the patient would be able to at least meet with the therapist for counseling.      Patient presents with intermittent episodes of low back sprain and muscle tightness.  Flexeril was refilled for this.    Follow up:  Return in about 6 weeks (around 6/9/2017) for Next scheduled follow up.     Patient Instructions:  Patient instructions were provided.

## 2017-04-28 NOTE — PATIENT INSTRUCTIONS
Major Depressive Disorder  Major depressive disorder is a mental illness. It also may be called clinical depression or unipolar depression. Major depressive disorder usually causes feelings of sadness, hopelessness, or helplessness. Some people with this disorder do not feel particularly sad but lose interest in doing things they used to enjoy (anhedonia). Major depressive disorder also can cause physical symptoms. It can interfere with work, school, relationships, and other normal everyday activities. The disorder varies in severity but is longer lasting and more serious than the sadness we all feel from time to time in our lives.  Major depressive disorder often is triggered by stressful life events or major life changes. Examples of these triggers include divorce, loss of your job or home, a move, and the death of a family member or close friend. Sometimes this disorder occurs for no obvious reason at all. People who have family members with major depressive disorder or bipolar disorder are at higher risk for developing this disorder, with or without life stressors. Major depressive disorder can occur at any age. It may occur just once in your life (single episode major depressive disorder). It may occur multiple times (recurrent major depressive disorder).  SYMPTOMS  People with major depressive disorder have either anhedonia or depressed mood on nearly a daily basis for at least 2 weeks or longer. Symptoms of depressed mood include:  · Feelings of sadness (blue or down in the dumps) or emptiness.  · Feelings of hopelessness or helplessness.  · Tearfulness or episodes of crying (may be observed by others).  · Irritability (children and adolescents).  In addition to depressed mood or anhedonia or both, people with this disorder have at least four of the following symptoms:  · Difficulty sleeping or sleeping too much.    · Significant change (increase or decrease) in appetite or weight.    · Lack of energy or  motivation.  · Feelings of guilt and worthlessness.    · Difficulty concentrating, remembering, or making decisions.  · Unusually slow movement (psychomotor retardation) or restlessness (as observed by others).    · Recurrent wishes for death, recurrent thoughts of self-harm (suicide), or a suicide attempt.  People with major depressive disorder commonly have persistent negative thoughts about themselves, other people, and the world. People with severe major depressive disorder may experience distorted beliefs or perceptions about the world (psychotic delusions). They also may see or hear things that are not real (psychotic hallucinations).  DIAGNOSIS  Major depressive disorder is diagnosed through an assessment by your health care provider. Your health care provider will ask about aspects of your daily life, such as mood, sleep, and appetite, to see if you have the diagnostic symptoms of major depressive disorder. Your health care provider may ask about your medical history and use of alcohol or drugs, including prescription medicines. Your health care provider also may do a physical exam and blood work. This is because certain medical conditions and the use of certain substances can cause major depressive disorder-like symptoms (secondary depression). Your health care provider also may refer you to a mental health specialist for further evaluation and treatment.  TREATMENT  It is important to recognize the symptoms of major depressive disorder and seek treatment. The following treatments can be prescribed for this disorder:    · Medicine. Antidepressant medicines usually are prescribed. Antidepressant medicines are thought to correct chemical imbalances in the brain that are commonly associated with major depressive disorder. Other types of medicine may be added if the symptoms do not respond to antidepressant medicines alone or if psychotic delusions or hallucinations occur.  · Talk therapy. Talk therapy can be  helpful in treating major depressive disorder by providing support, education, and guidance. Certain types of talk therapy also can help with negative thinking (cognitive behavioral therapy) and with relationship issues that trigger this disorder (interpersonal therapy).  A mental health specialist can help determine which treatment is best for you. Most people with major depressive disorder do well with a combination of medicine and talk therapy. Treatments involving electrical stimulation of the brain can be used in situations with extremely severe symptoms or when medicine and talk therapy do not work over time. These treatments include electroconvulsive therapy, transcranial magnetic stimulation, and vagal nerve stimulation.     This information is not intended to replace advice given to you by your health care provider. Make sure you discuss any questions you have with your health care provider.     Document Released: 04/14/2014 Document Revised: 01/08/2016 Document Reviewed: 04/14/2014  [employer] Patient Education ©2016 [employer]

## 2017-05-22 ENCOUNTER — OFFICE VISIT (OUTPATIENT)
Dept: PSYCHIATRY | Facility: CLINIC | Age: 27
End: 2017-05-22

## 2017-05-22 DIAGNOSIS — F39 MOOD DISORDER (HCC): Primary | ICD-10-CM

## 2017-05-22 DIAGNOSIS — F41.9 ANXIETY DISORDER, UNSPECIFIED TYPE: ICD-10-CM

## 2017-05-22 PROCEDURE — 90791 PSYCH DIAGNOSTIC EVALUATION: CPT | Performed by: SOCIAL WORKER

## 2017-06-05 ENCOUNTER — OFFICE VISIT (OUTPATIENT)
Dept: PSYCHIATRY | Facility: CLINIC | Age: 27
End: 2017-06-05

## 2017-06-05 DIAGNOSIS — F41.9 ANXIETY DISORDER, UNSPECIFIED TYPE: ICD-10-CM

## 2017-06-05 DIAGNOSIS — F39 MOOD DISORDER (HCC): Primary | ICD-10-CM

## 2017-06-05 PROCEDURE — 90834 PSYTX W PT 45 MINUTES: CPT | Performed by: SOCIAL WORKER
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
## 2017-06-27 ENCOUNTER — HOSPITAL ENCOUNTER (EMERGENCY)
Facility: HOSPITAL | Age: 27
Discharge: HOME | End: 2017-06-27
Attending: EMERGENCY MEDICINE | Admitting: EMERGENCY MEDICINE

## 2017-06-27 VITALS
DIASTOLIC BLOOD PRESSURE: 74 MMHG | HEART RATE: 91 BPM | OXYGEN SATURATION: 100 % | BODY MASS INDEX: 31.83 KG/M2 | TEMPERATURE: 98.5 F | RESPIRATION RATE: 18 BRPM | SYSTOLIC BLOOD PRESSURE: 135 MMHG | HEIGHT: 72 IN | WEIGHT: 235 LBS
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
## 2017-06-27 PROCEDURE — 86382 NEUTRALIZATION TEST VIRAL: CPT

## 2017-06-27 PROCEDURE — 86317 IMMUNOASSAY INFECTIOUS AGENT: CPT

## 2017-06-27 PROCEDURE — 90675 RABIES VACCINE IM: CPT | Performed by: PHYSICIAN ASSISTANT

## 2017-06-27 PROCEDURE — 99282 EMERGENCY DEPT VISIT SF MDM: CPT

## 2017-06-27 PROCEDURE — 25010000002 RABIES IMMUNE GLOBULIN PER 150 INT'L UNITS: Performed by: PHYSICIAN ASSISTANT

## 2017-06-27 PROCEDURE — 86790 VIRUS ANTIBODY NOS: CPT

## 2017-06-27 PROCEDURE — 90376 RABIES IG HEAT TREATED: CPT | Performed by: PHYSICIAN ASSISTANT

## 2017-06-27 PROCEDURE — 96372 THER/PROPH/DIAG INJ SC/IM: CPT

## 2017-06-27 PROCEDURE — 90471 IMMUNIZATION ADMIN: CPT

## 2017-06-27 PROCEDURE — 25010000002 RABIES VACCINE PER 1 ML: Performed by: PHYSICIAN ASSISTANT

## 2017-06-27 RX ADMIN — RABIES VACCINE 1 ML: KIT at 21:06

## 2017-06-27 RX ADMIN — RABIES IMMUNE GLOBULIN (HUMAN) 2140.5 UNITS: 150 INJECTION INTRAMUSCULAR at 21:09

## 2017-06-28 NOTE — ED PROVIDER NOTES
Subjective   HPI Comments: Patient is otherwise healthy 26-year-old female who is here today complaining that she was bitten by a dog outside and convenience store.  Patient states that the person was unknown to her as well as the dog was unknown to her.  Patient states that after the dog bit her the person walked away quickly.  Patient came here to the emergency room without complaints of bleeding or significant tenderness or significant open wound, wondered about her tetanus shot which was updated last year.      History provided by:  Patient      Review of Systems   Skin: Positive for wound.   All other systems reviewed and are negative.      Past Medical History:   Diagnosis Date   • Asthma    • Bipolar depression    • Hypertension        No Known Allergies    History reviewed. No pertinent surgical history.    Family History   Problem Relation Age of Onset   • Cancer Mother    • Hyperlipidemia Mother    • Hypertension Mother    • Obesity Mother    • Stroke Father    • Hypertension Father    • Heart disease Father    • Depression Sister    • Depression Brother    • Cancer Maternal Grandmother    • Hyperlipidemia Maternal Grandmother    • Hypertension Maternal Grandmother    • Obesity Maternal Grandmother    • Cancer Maternal Grandfather    • Hyperlipidemia Maternal Grandfather    • Hypertension Maternal Grandfather    • Obesity Maternal Grandfather    • Depression Sister    • Depression Sister    • Depression Brother    • Depression Brother        Social History     Social History   • Marital status: Single     Spouse name: N/A   • Number of children: N/A   • Years of education: N/A     Social History Main Topics   • Smoking status: Current Every Day Smoker     Packs/day: 0.25   • Smokeless tobacco: None   • Alcohol use No   • Drug use: No   • Sexual activity: Not Asked     Other Topics Concern   • None     Social History Narrative   • None           Objective   Physical Exam   Constitutional: She is oriented to  person, place, and time. She appears well-developed and well-nourished.   Cardiovascular: Normal rate.    Pulmonary/Chest: Effort normal.   Abdominal: Soft.   Musculoskeletal: Normal range of motion.   Neurological: She is alert and oriented to person, place, and time.   Skin:   There is superficial abrasions that have broken the epidermis on the right abdomen, there is no full thickness into the subcutaneous tissue and no puncture wounds present, there is no bleeding, it is clean   Psychiatric: She has a normal mood and affect. Her behavior is normal. Thought content normal.   Nursing note and vitals reviewed.      Procedures         ED Course  ED Course      She was bitten by unknown dog, we have been to to her that she have rabies vaccinations.  These were performed immunoglobulin and vaccine.  Patient will follow scheduled day 0, 3, 7, 14.  Patient will return for worsening symptoms.  Patient given prescription for Augmentin to prevent infection.            [facility]    Final diagnoses:   Dog bite, initial encounter            Louisa Diop PA-C  06/27/17 [id number]

## 2017-06-28 NOTE — DISCHARGE INSTRUCTIONS
Pt should go to health dept or return here for follow up rabies vaccinations  Days  0       6/27  Tue (today)  3       6/30 Fri  7       7/4    Tue  14     7/11  Tue

## 2017-07-10 ENCOUNTER — OFFICE VISIT (OUTPATIENT)
Dept: PSYCHIATRY | Facility: CLINIC | Age: 27
End: 2017-07-10

## 2017-07-10 VITALS — HEIGHT: 72 IN | BODY MASS INDEX: 33.32 KG/M2 | WEIGHT: 246 LBS

## 2017-07-10 DIAGNOSIS — F39 MOOD DISORDER (HCC): Primary | ICD-10-CM

## 2017-07-10 DIAGNOSIS — F41.9 ANXIETY DISORDER, UNSPECIFIED TYPE: ICD-10-CM

## 2017-07-10 DIAGNOSIS — F17.210 NICOTINE DEPENDENCE, CIGARETTES, UNCOMPLICATED: ICD-10-CM

## 2017-07-10 DIAGNOSIS — F12.10 CANNABIS ABUSE: ICD-10-CM

## 2017-07-10 PROCEDURE — 99213 OFFICE O/P EST LOW 20 MIN: CPT | Performed by: NURSE PRACTITIONER

## 2017-07-10 RX ORDER — OXCARBAZEPINE 150 MG/1
150 TABLET, FILM COATED ORAL 2 TIMES DAILY
Qty: 60 TABLET | Refills: 1 | Status: SHIPPED | OUTPATIENT
Start: 2017-07-10 | End: 2017-10-17 | Stop reason: SDUPTHER

## 2017-07-10 RX ORDER — BUSPIRONE HYDROCHLORIDE 5 MG/1
5 TABLET ORAL 3 TIMES DAILY
Qty: 90 TABLET | Refills: 1 | Status: SHIPPED | OUTPATIENT
Start: 2017-07-10 | End: 2017-10-17 | Stop reason: SDUPTHER
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
## 2017-07-12 DIAGNOSIS — F41.9 ANXIETY: ICD-10-CM

## 2017-07-18 RX ORDER — HYDROXYZINE HYDROCHLORIDE 25 MG/1
TABLET, FILM COATED ORAL
Qty: 60 TABLET | Refills: 2 | Status: SHIPPED | OUTPATIENT
Start: 2017-07-18 | End: 2017-10-17

## 2017-08-28 DIAGNOSIS — S33.5XXD LOW BACK SPRAIN, SUBSEQUENT ENCOUNTER: ICD-10-CM

## 2017-08-29 RX ORDER — CYCLOBENZAPRINE HCL 10 MG
TABLET ORAL
Qty: 30 TABLET | Refills: 0 | Status: SHIPPED | OUTPATIENT
Start: 2017-08-29 | End: 2019-11-13

## 2017-09-21 ENCOUNTER — OFFICE VISIT (OUTPATIENT)
Dept: FAMILY MEDICINE CLINIC | Facility: CLINIC | Age: 27
End: 2017-09-21

## 2017-09-21 VITALS
DIASTOLIC BLOOD PRESSURE: 70 MMHG | BODY MASS INDEX: 33.2 KG/M2 | SYSTOLIC BLOOD PRESSURE: 126 MMHG | OXYGEN SATURATION: 98 % | WEIGHT: 245.12 LBS | HEIGHT: 72 IN | HEART RATE: 105 BPM | TEMPERATURE: 98.3 F

## 2017-09-21 DIAGNOSIS — K52.9 GASTROENTERITIS: Primary | ICD-10-CM

## 2017-09-21 PROCEDURE — 99213 OFFICE O/P EST LOW 20 MIN: CPT | Performed by: INTERNAL MEDICINE

## 2017-09-21 RX ORDER — ONDANSETRON 4 MG/1
4 TABLET, ORALLY DISINTEGRATING ORAL EVERY 8 HOURS PRN
Qty: 20 TABLET | Refills: 0 | OUTPATIENT
Start: 2017-09-21 | End: 2019-10-23

## 2017-09-21 RX ORDER — LOPERAMIDE HYDROCHLORIDE 2 MG/1
2 CAPSULE ORAL 4 TIMES DAILY PRN
Qty: 20 CAPSULE | Refills: 0 | Status: SHIPPED | OUTPATIENT
Start: 2017-09-21 | End: 2019-11-13

## 2017-09-21 NOTE — PATIENT INSTRUCTIONS
Viral Gastroenteritis, Adult  Viral gastroenteritis is also known as the stomach flu. This condition is caused by various viruses. These viruses can be passed from person to person very easily (are very contagious). This condition may affect your stomach, small intestine, and large intestine. It can cause sudden watery diarrhea, fever, and vomiting.  Diarrhea and vomiting can make you feel weak and cause you to become dehydrated. You may not be able to keep fluids down. Dehydration can make you tired and thirsty, cause you to have a dry mouth, and decrease how often you urinate. Older adults and people with other diseases or a weak immune system are at higher risk for dehydration.  It is important to replace the fluids that you lose from diarrhea and vomiting. If you become severely dehydrated, you may need to get fluids through an IV tube.  CAUSES  Gastroenteritis is caused by various viruses, including rotavirus and norovirus. Norovirus is the most common cause in adults.  You can get sick by eating food, drinking water, or touching a surface contaminated with one of these viruses. You can also get sick from sharing utensils or other personal items with an infected person.  RISK FACTORS  This condition is more likely to develop in people:  · Who have a weak defense system (immune system).  · Who live with one or more children who are younger than 2 years old.  · Who live in a nursing home.  · Who go on cruise ships.  SYMPTOMS  Symptoms of this condition start suddenly 1-2 days after exposure to a virus. Symptoms may last a few days or as long as a week. The most common symptoms are watery diarrhea and vomiting. Other symptoms include:  · Fever.  · Headache.  · Fatigue.  · Pain in the abdomen.  · Chills.  · Weakness.  · Nausea.  · Muscle aches.  · Loss of appetite.  DIAGNOSIS  This condition is diagnosed with a medical history and physical exam. You may also have a stool test to check for viruses or other  infections.  TREATMENT  This condition typically goes away on its own. The focus of treatment is to restore lost fluids (rehydration). Your health care provider may recommend that you take an oral rehydration solution (ORS) to replace important salts and minerals (electrolytes) in your body. Severe cases of this condition may require giving fluids through an IV tube.  Treatment may also include medicine to help with your symptoms.  HOME CARE INSTRUCTIONS  Follow instructions from your health care provider about how to care for yourself at home.  Eating and Drinking  Follow these recommendations as told by your health care provider:  · Take an ORS. This is a drink that is sold at pharmacies and retail stores.  · Drink clear fluids in small amounts as you are able. Clear fluids include water, ice chips, diluted fruit juice, and low-calorie sports drinks.  · Eat bland, easy-to-digest foods in small amounts as you are able. These foods include bananas, applesauce, rice, lean meats, toast, and crackers.  · Avoid fluids that contain a lot of sugar or caffeine, such as energy drinks, sports drinks, and soda.  · Avoid alcohol.  · Avoid spicy or fatty foods.  General Instructions  · Drink enough fluid to keep your urine clear or pale yellow.  · Wash your hands often. If soap and water are not available, use hand .  · Make sure that all people in your household wash their hands well and often.  · Take over-the-counter and prescription medicines only as told by your health care provider.  · Rest at home while you recover.  · Watch your condition for any changes.  · Take a warm bath to relieve any burning or pain from frequent diarrhea episodes.  · Keep all follow-up visits as told by your health care provider. This is important.  SEEK MEDICAL CARE IF:  · You cannot keep fluids down.  · Your symptoms get worse.  · You have new symptoms.  · You feel light-headed or dizzy.  · You have muscle cramps.  SEEK IMMEDIATE  MEDICAL CARE IF:  · You have chest pain.  · You feel extremely weak or you faint.  · You see blood in your vomit.  · Your vomit looks like coffee grounds.  · You have bloody or black stools or stools that look like tar.  · You have a severe headache, a stiff neck, or both.  · You have a rash.  · You have severe pain, cramping, or bloating in your abdomen.  · You have trouble breathing or you are breathing very quickly.  · Your heart is beating very quickly.  · Your skin feels cold and clammy.  · You feel confused.  · You have pain when you urinate.  · You have signs of dehydration, such as:    Dark urine, very little urine, or no urine.    Cracked lips.    Dry mouth.    Sunken eyes.    Sleepiness.    Weakness.     This information is not intended to replace advice given to you by your health care provider. Make sure you discuss any questions you have with your health care provider.     Document Released: 12/18/2006 Document Revised: 04/10/2017 Document Reviewed: 08/23/2016  [employer] Patient Education ©2017 [employer]

## 2017-09-21 NOTE — PROGRESS NOTES
Chief Complaint   Patient presents with   • Vomiting     Patient states that she has been vomiting and diarrhea since last night.       Subjective     History of Present Illness   Linette Hanley is a 26 y.o. female. Vomiting since last night and diarrhea x 2 days.  Does recall possible fried mushrooms which may have caused symptoms.  Has Chills without fevers.  Able to keep liquids down today.  Pt has had numerous episodes of diarrhea and was late to visit due to vomiting.  Patient wishes to return to work.     The following portions of the patient's history were reviewed and updated as appropriate: allergies, current medications, past family history, past medical history, past social history, past surgical history and problem list.    Review of Systems   Constitutional: Negative for chills, fatigue and fever.   HENT: Negative for congestion, ear pain, rhinorrhea, sinus pressure and sore throat.    Eyes: Negative for visual disturbance.   Respiratory: Negative for cough, chest tightness, shortness of breath and wheezing.    Cardiovascular: Negative for chest pain, palpitations and leg swelling.   Gastrointestinal: Positive for abdominal pain. Negative for blood in stool, constipation, diarrhea, nausea and vomiting.   Endocrine: Negative for polydipsia and polyuria.   Genitourinary: Negative for dysuria and hematuria.   Musculoskeletal: Negative for back pain.   Skin: Negative for rash.   Neurological: Negative for dizziness, light-headedness, numbness and headaches.   Psychiatric/Behavioral: Negative for dysphoric mood and sleep disturbance. The patient is not nervous/anxious.        No Known Allergies    Past Medical History:   Diagnosis Date   • Asthma    • Bipolar depression    • Hypertension        Social History     Social History   • Marital status: Single     Spouse name: N/A   • Number of children: N/A   • Years of education: N/A     Occupational History   • Not on file.     Social History Main Topics   •  Smoking status: Current Every Day Smoker     Packs/day: 0.25   • Smokeless tobacco: Not on file   • Alcohol use No   • Drug use: No   • Sexual activity: Yes     Partners: Female     Other Topics Concern   • Not on file     Social History Narrative        No past surgical history on file.    Family History   Problem Relation Age of Onset   • Cancer Mother    • Hyperlipidemia Mother    • Hypertension Mother    • Obesity Mother    • Stroke Father    • Hypertension Father    • Heart disease Father    • Depression Sister    • Depression Brother    • Cancer Maternal Grandmother    • Hyperlipidemia Maternal Grandmother    • Hypertension Maternal Grandmother    • Obesity Maternal Grandmother    • Cancer Maternal Grandfather    • Hyperlipidemia Maternal Grandfather    • Hypertension Maternal Grandfather    • Obesity Maternal Grandfather    • Depression Sister    • Depression Sister    • Depression Brother    • Depression Brother          Current Outpatient Prescriptions:   •  amoxicillin-clavulanate (AUGMENTIN) 400-57 MG per chewable tablet, Chew 1 tablet 2 (Two) Times a Day., Disp: 17 tablet, Rfl: 0  •  buPROPion XL (WELLBUTRIN XL) 300 MG 24 hr tablet, Take 1 tablet by mouth Daily., Disp: 30 tablet, Rfl: 5  •  busPIRone (BUSPAR) 5 MG tablet, Take 1 tablet by mouth 3 (Three) Times a Day., Disp: 90 tablet, Rfl: 1  •  cyclobenzaprine (FLEXERIL) 10 MG tablet, take 1 tablet place twice a day if needed for muscle spasm, Disp: 30 tablet, Rfl: 0  •  hydrOXYzine (ATARAX) 25 MG tablet, take 1 tablet by mouth every 8 hours if needed for itching, Disp: 60 tablet, Rfl: 2  •  ibuprofen (ADVIL,MOTRIN) 600 MG tablet, Take 1 tablet by mouth Every 6 (Six) Hours As Needed for mild pain (1-3)., Disp: 28 tablet, Rfl: 0  •  OXcarbazepine (TRILEPTAL) 150 MG tablet, Take 1 tablet by mouth 2 (Two) Times a Day., Disp: 60 tablet, Rfl: 1  •  loperamide (IMODIUM) 2 MG capsule, Take 1 capsule by mouth 4 (Four) Times a Day As Needed for Diarrhea., Disp:  "20 capsule, Rfl: 0  •  ondansetron ODT (ZOFRAN ODT) 4 MG disintegrating tablet, Take 1 tablet by mouth Every 8 (Eight) Hours As Needed for Nausea or Vomiting., Disp: 20 tablet, Rfl: 0    Objective   /70 (BP Location: Right arm, Patient Position: Sitting)  Pulse 105  Temp 98.3 °F (36.8 °C) (Oral)   Ht 72\" (182.9 cm)  Wt 245 lb 1.9 oz (111 kg)  SpO2 98%  BMI 33.24 kg/m2    Physical Exam   Constitutional: She is oriented to person, place, and time. She appears well-developed and well-nourished.   HENT:   Head: Normocephalic and atraumatic.   Eyes: Conjunctivae are normal.   Cardiovascular: Normal rate and regular rhythm.    Pulmonary/Chest: Effort normal.   Abdominal: Soft. Bowel sounds are normal.   Mild epigastric tenderness.    Musculoskeletal: Normal range of motion.   Neurological: She is alert and oriented to person, place, and time.   Psychiatric: She has a normal mood and affect. Her behavior is normal.   Nursing note and vitals reviewed.      Assessment/Plan   Linette was seen today for vomiting.    Diagnoses and all orders for this visit:    Gastroenteritis  -     ondansetron ODT (ZOFRAN ODT) 4 MG disintegrating tablet; Take 1 tablet by mouth Every 8 (Eight) Hours As Needed for Nausea or Vomiting.  -     loperamide (IMODIUM) 2 MG capsule; Take 1 capsule by mouth 4 (Four) Times a Day As Needed for Diarrhea.        Discussion Summary:     conservative measures discussed for management of gastroenteritis.     Follow up:  Return if symptoms worsen or fail to improve.     Patient Instructions:  Patient instructions were provided.  "

## 2017-10-10 ENCOUNTER — OFFICE VISIT (OUTPATIENT)
Dept: PSYCHIATRY | Facility: CLINIC | Age: 27
End: 2017-10-10

## 2017-10-10 DIAGNOSIS — F12.10 CANNABIS ABUSE: ICD-10-CM

## 2017-10-10 DIAGNOSIS — F39 MOOD DISORDER (HCC): Primary | ICD-10-CM

## 2017-10-10 DIAGNOSIS — F41.9 ANXIETY DISORDER, UNSPECIFIED TYPE: ICD-10-CM

## 2017-10-10 DIAGNOSIS — F17.210 NICOTINE DEPENDENCE, CIGARETTES, UNCOMPLICATED: ICD-10-CM

## 2017-10-10 PROCEDURE — 90834 PSYTX W PT 45 MINUTES: CPT | Performed by: SOCIAL WORKER

## 2017-10-12 NOTE — PROGRESS NOTES
"Date of Service: 10/10/17  Time In: 407  Time Out: 447      PROGRESS NOTE  Data:  Linette Hanley is a 26 y.o. female who met with the undersigned for a regularly scheduled individual outpatient psychotherapy session at  [facility].  Patient started psychotherapy session by reporting \"Im working 2 jobs now\".  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  Patient states that she's been having vivid dreams about her son  She is not having her son for 2 years.  Patient discussed her son is living with his paternal grandmother in [facility] and the paternal grandmother is a cardiologist at the [facility] [employer].  Patient discussed her son is 9 years old and she explained she feels she has failed him.  Patient denies any cutting episodes since last session.  Patient apologizes for lapse in coming to therapy but explained working 2 full-time jobs has been overwhelming.  Patient explains increased irritability and anger.  Patient discussed black and white or all or nothing thinking during session and states \"my mind is really messed up\".  Patient discussed she continues to live with her girlfriend.  Patient discussed her largest support system is her mother and her father.  Patient discussed her biological mother has bipolar disorder and schizophrenia.  Patient denies hallucinations or perceptual disturbances.  Patient discussed she was adopted as a young baby by her parents.  Patient discusses a lack of trust.  Patient states I have a lot of trust issues\".      HPI: Depression, mood disorder, anxiety, life stressors      Clinical Maneuvering/Intervention:  Assisted patient in processing above session content; acknowledged and normalized patient’s thoughts, feelings, and concerns. Assisted patient in processing her feelings about feeling depressed, anxious, overwhelmed and angry/irritability. Assisted patient in processing when her " mood fluctuations.  Validated patient's thoughts and feelings when discussing life stressors related to her son and not being his primary caregiver for the past 2 years.  Assisted patient processing her thoughts and feelings of wanting her son back but not feeling she is ready to start the process of getting him back.  Validated patient's thoughts and feelings of having black and white or all or nothing thinking so easily discouraged to try to pursue a relationship with him.  Encouraged pt the importance of keeping all appointments and taking medications as prescribed and calling with any questions or concerns.  Assisted patient in understanding the importance of seeing the interdisciplinary team for continuity of care. Patient is able to acknowledge she will benefit from therapy and seeing a psychiatric APRN. Attempted to build rapport and trust with patient during  psychotherapy session and reviewed safety plan for patient to call her girlfriend, present to the ER or call 911 is SI or HI occurs.  Educated on psychosis in patient denies symptoms of psychosis.  Educated on taking small steps to build a relationship with her son by seeing him when he stays the night with her mother for weekly sending him a card and educated on the importance of consistency in parenting and making sure that he is in a good place to start the process with his therapist and his paternal grandmother that has been caring for him for the past 2 years.  Patient to work on opening communication with her family and her son's paternal family.  Role-played healthy communication skills to prevent passive-aggressive communication      Allowed patient to freely discuss issues without interruption or judgment. Provided safe, confidential environment to facilitate the development of positive therapeutic relationship and encourage open, honest communication. Assisted patient in identifying risk factors which would indicate the need for higher level  of care including thoughts to harm self or others and/or self-harming behavior and encouraged patient to contact this office, call 911, or present to the nearest emergency room should any of these events occur. Discussed crisis intervention services and means to access.  Patient adamantly and convincingly denies current suicidal or homicidal ideation or perceptual disturbance.    Assessment     Diagnoses and all orders for this visit:    Mood disorder    Anxiety disorder, unspecified type    Cannabis abuse    Nicotine dependence, cigarettes, uncomplicated               Mental Status Exam  Hygiene:  good  Dress:  casual  Attitude:  Cooperative  Motor Activity:  Appropriate  Speech:  Normal  Mood:  anxious, depressed, irritable and sad  Affect:  depressed, anxious and irritable  Thought Processes:  Goal directed  Thought Content:  normal  Suicidal Thoughts:  denies  Homicidal Thoughts:  denies  Crisis Safety Plan: yes, to come to the emergency room.  Hallucinations:  denies    Patient's Support Network Includes:  significant other and parents    Progress toward goal: Not at goal    Functional Status: Moderate impairment     Prognosis: Fair with Ongoing Treatment     Plan         Patient will adhere to medication regimen as prescribed and report any side effects. Patient will contact this office, call 911 or present to the nearest emergency room should suicidal or homicidal ideations occur. Provide Cognitive Behavioral Therapy and Solution Focused Therapy to improve functioning, maintain stability, and avoid decompensation and the need for higher level of care.          Return in about 3 weeks (around 10/31/2017), or if symptoms worsen or fail to improve.    Chelle Brand LCSW,  October 12, 2017    This document signed by Chelle Brand LCSW,  October 12, 2017 4:05 PM

## 2017-10-17 ENCOUNTER — OFFICE VISIT (OUTPATIENT)
Dept: PSYCHIATRY | Facility: CLINIC | Age: 27
End: 2017-10-17

## 2017-10-17 VITALS — WEIGHT: 252 LBS | HEIGHT: 72 IN | BODY MASS INDEX: 34.13 KG/M2

## 2017-10-17 DIAGNOSIS — F41.9 ANXIETY: ICD-10-CM

## 2017-10-17 DIAGNOSIS — F12.10 CANNABIS ABUSE: ICD-10-CM

## 2017-10-17 DIAGNOSIS — F39 MOOD DISORDER (HCC): Primary | ICD-10-CM

## 2017-10-17 DIAGNOSIS — F17.210 NICOTINE DEPENDENCE, CIGARETTES, UNCOMPLICATED: ICD-10-CM

## 2017-10-17 PROCEDURE — 99213 OFFICE O/P EST LOW 20 MIN: CPT | Performed by: NURSE PRACTITIONER

## 2017-10-17 RX ORDER — BUSPIRONE HYDROCHLORIDE 10 MG/1
10 TABLET ORAL 3 TIMES DAILY
Qty: 90 TABLET | Refills: 2 | Status: SHIPPED | OUTPATIENT
Start: 2017-10-17 | End: 2019-11-13

## 2017-10-17 RX ORDER — OXCARBAZEPINE 300 MG/1
300 TABLET, FILM COATED ORAL 2 TIMES DAILY
Qty: 60 TABLET | Refills: 2 | Status: SHIPPED | OUTPATIENT
Start: 2017-10-17 | End: 2019-11-13

## 2017-10-17 RX ORDER — BUPROPION HYDROCHLORIDE 300 MG/1
300 TABLET ORAL DAILY
Qty: 30 TABLET | Refills: 2 | Status: SHIPPED | OUTPATIENT
Start: 2017-10-17 | End: 2019-11-13

## 2017-10-17 NOTE — PROGRESS NOTES
"    Subjective   Linette Hanley is a 26 y.o. female who is here today for medication management follow up.    Chief Complaint: Diagnoses and all orders for this visit:    Mood disorder    Anxiety  -     buPROPion XL (WELLBUTRIN XL) 300 MG 24 hr tablet; Take 1 tablet by mouth Daily.    Cannabis abuse    Nicotine dependence, cigarettes, uncomplicated    Other orders  -     OXcarbazepine (TRILEPTAL) 300 MG tablet; Take 1 tablet by mouth 2 (Two) Times a Day.  -     busPIRone (BUSPAR) 10 MG tablet; Take 1 tablet by mouth 3 (Three) Times a Day.        History of Present Illness Patient presents alone. She reports she is having a lot of guilt over not seeing her 8yo son for 4 months and he lives with paternal grandmother. She reports irritability, anger and some depression. She is working two jobs one at [employer] and one at [employer] still living with her girlfriend. She states she doesn't think her medicaitons are working however, she ran out of what I put her on because she didn't come back , no shows. Discussed medications and having to sometimes adjust them, so she needs to return for appointments so we can work together, she denies SI/HI or AVH. She is sleeping as tired as she gets from working. Still smokes both THC and nicotine    (Scales based on 0 - 10 with 10 being the worst)        The following portions of the patient's history were reviewed and updated as appropriate: allergies, current medications, past family history, past medical history, past social history, past surgical history and problem list.    Review of Systemsdenies fever, cough, s/s’s of infection, denies GI/ problems, denies new medical issues     Objective   Physical Exam  Height 72\" (182.9 cm), weight 252 lb (114 kg).    No Known Allergies    Current Medications:   Current Outpatient Prescriptions   Medication Sig Dispense Refill   • amoxicillin-clavulanate (AUGMENTIN) 400-57 MG per chewable tablet Chew 1 tablet 2 (Two) Times a Day. 17 tablet " 0   • buPROPion XL (WELLBUTRIN XL) 300 MG 24 hr tablet Take 1 tablet by mouth Daily. 30 tablet 2   • busPIRone (BUSPAR) 10 MG tablet Take 1 tablet by mouth 3 (Three) Times a Day. 90 tablet 2   • cyclobenzaprine (FLEXERIL) 10 MG tablet take 1 tablet place twice a day if needed for muscle spasm 30 tablet 0   • ibuprofen (ADVIL,MOTRIN) 600 MG tablet Take 1 tablet by mouth Every 6 (Six) Hours As Needed for mild pain (1-3). 28 tablet 0   • loperamide (IMODIUM) 2 MG capsule Take 1 capsule by mouth 4 (Four) Times a Day As Needed for Diarrhea. 20 capsule 0   • ondansetron ODT (ZOFRAN ODT) 4 MG disintegrating tablet Take 1 tablet by mouth Every 8 (Eight) Hours As Needed for Nausea or Vomiting. 20 tablet 0   • OXcarbazepine (TRILEPTAL) 300 MG tablet Take 1 tablet by mouth 2 (Two) Times a Day. 60 tablet 2     No current facility-administered medications for this visit.        Mental Status Exam:   Appearance: appropriate and casual  Hygiene:   fair  Cooperation:  Cooperative  Eye Contact:  Good  Psychomotor Behavior:  Appropriate  Mood:  dysthymic  Affect:  Appropriate  Hopelessness: Denies  Speech:  Normal  Thought Process:  Linear  Thought Content:  Normal  Suicidal:  None  Homicidal:  None  Hallucinations:  None  Delusion:  None  Memory:  Intact  Orientation:  Person, Place, Time and Situation  Reliability:  fair  Insight:  Fair  Judgement:  Fair  Impulse Control:  Fair  Estimated Intelligence: average range   Physical/Medical Issues:  No     Assessment/Plan   Diagnoses and all orders for this visit:    Mood disorder    Anxiety  -     buPROPion XL (WELLBUTRIN XL) 300 MG 24 hr tablet; Take 1 tablet by mouth Daily.    Cannabis abuse    Nicotine dependence, cigarettes, uncomplicated    Other orders  -     OXcarbazepine (TRILEPTAL) 300 MG tablet; Take 1 tablet by mouth 2 (Two) Times a Day.  -     busPIRone (BUSPAR) 10 MG tablet; Take 1 tablet by mouth 3 (Three) Times a Day.      Will get back on Trileptal and begin 300mg at hs  for 7 days then bid for mood stabilzation   Cont Wellbutrin XL for depression   Restart buspar for anxiety   · Counseled on cannabis cessation and tobacco she is not ready to quit    We discussed risks, benefits, and side effects of the above medications and the patient was agreeable with the plan. Patient was educated on the importance of compliance with treatment and follow-up appointments.     Instructed to call for questions or concerns and return early if necessary. Crisis plan reviewed including going to the Emergency department.    Return in about 6 weeks (around 11/28/2017).

## 2018-07-26 ENCOUNTER — HOSPITAL ENCOUNTER (EMERGENCY)
Facility: HOSPITAL | Age: 28
Discharge: HOME | End: 2018-07-26
Attending: EMERGENCY MEDICINE | Admitting: EMERGENCY MEDICINE

## 2018-07-26 VITALS
WEIGHT: 250 LBS | BODY MASS INDEX: 33.86 KG/M2 | RESPIRATION RATE: 16 BRPM | TEMPERATURE: 98.9 F | HEIGHT: 72 IN | DIASTOLIC BLOOD PRESSURE: 84 MMHG | SYSTOLIC BLOOD PRESSURE: 121 MMHG | OXYGEN SATURATION: 100 % | HEART RATE: 78 BPM

## 2018-07-26 DIAGNOSIS — S61.512A LACERATION OF LEFT WRIST, INITIAL ENCOUNTER: Primary | ICD-10-CM

## 2018-07-26 DIAGNOSIS — S61.011A LACERATION OF RIGHT THUMB WITHOUT FOREIGN BODY WITHOUT DAMAGE TO NAIL, INITIAL ENCOUNTER: ICD-10-CM

## 2018-07-26 PROCEDURE — 99283 EMERGENCY DEPT VISIT LOW MDM: CPT

## 2018-07-26 RX ORDER — PROPRANOLOL HYDROCHLORIDE 160 MG/1
CAPSULE, EXTENDED RELEASE ORAL
COMMUNITY
End: 2019-11-13

## 2018-07-26 RX ORDER — VILAZODONE HYDROCHLORIDE 20 MG/1
20 TABLET ORAL DAILY
COMMUNITY
End: 2019-11-13

## 2018-07-26 RX ORDER — LIDOCAINE HYDROCHLORIDE 10 MG/ML
10 INJECTION, SOLUTION INFILTRATION; PERINEURAL ONCE
Status: DISCONTINUED | OUTPATIENT
Start: 2018-07-26 | End: 2018-07-26

## 2018-07-26 RX ORDER — LIDOCAINE HYDROCHLORIDE 20 MG/ML
10 INJECTION, SOLUTION INFILTRATION; PERINEURAL ONCE
Status: COMPLETED | OUTPATIENT
Start: 2018-07-26 | End: 2018-07-26

## 2018-07-26 RX ADMIN — LIDOCAINE HYDROCHLORIDE 10 ML: 20 INJECTION, SOLUTION INFILTRATION; PERINEURAL at 22:30

## 2019-10-23 ENCOUNTER — HOSPITAL ENCOUNTER (EMERGENCY)
Facility: HOSPITAL | Age: 29
Discharge: HOME | End: 2019-10-23
Attending: EMERGENCY MEDICINE | Admitting: EMERGENCY MEDICINE

## 2019-10-23 VITALS
DIASTOLIC BLOOD PRESSURE: 87 MMHG | HEART RATE: 97 BPM | OXYGEN SATURATION: 100 % | BODY MASS INDEX: 26.26 KG/M2 | TEMPERATURE: 97.3 F | SYSTOLIC BLOOD PRESSURE: 132 MMHG | WEIGHT: 183.4 LBS | HEIGHT: 70 IN | RESPIRATION RATE: 18 BRPM

## 2019-10-23 DIAGNOSIS — H10.13 ALLERGIC CONJUNCTIVITIS OF BOTH EYES AND RHINITIS: Primary | ICD-10-CM

## 2019-10-23 DIAGNOSIS — J30.9 ALLERGIC CONJUNCTIVITIS OF BOTH EYES AND RHINITIS: Primary | ICD-10-CM

## 2019-10-23 PROCEDURE — 99282 EMERGENCY DEPT VISIT SF MDM: CPT

## 2019-10-23 RX ORDER — OLOPATADINE HYDROCHLORIDE 1 MG/ML
1 SOLUTION/ DROPS OPHTHALMIC 2 TIMES DAILY
Qty: 5 ML | Refills: 0 | Status: SHIPPED | OUTPATIENT
Start: 2019-10-23 | End: 2019-11-13

## 2019-10-23 RX ORDER — CETIRIZINE HYDROCHLORIDE 10 MG/1
10 TABLET ORAL DAILY
Qty: 30 TABLET | Refills: 0 | Status: SHIPPED | OUTPATIENT
Start: 2019-10-23 | End: 2019-11-13

## 2019-10-23 NOTE — ED PROVIDER NOTES
Subjective   History of Present Illness  This is a 29-year-old female who comes in today complaining of red itchy eyes.  She reports that symptoms started yesterday.  She states that the drainage is clear and stringy in appearance.  She also complains of nasal congestion.  She denies any fever chills nausea or vomiting.  She denies any yellow drainage or discharge from her eyes.  Review of Systems   Constitutional: Negative.    HENT: Positive for rhinorrhea.    Eyes: Positive for redness and itching.   Respiratory: Negative.    Cardiovascular: Negative.    Gastrointestinal: Negative.    Endocrine: Negative.    Genitourinary: Negative.    Musculoskeletal: Negative.    Skin: Negative.    Allergic/Immunologic: Negative.    Neurological: Negative.    Hematological: Negative.    Psychiatric/Behavioral: Negative.    All other systems reviewed and are negative.      Past Medical History:   Diagnosis Date   • Asthma    • Bipolar depression (CMS/[facility])    • Hypertension        Allergies   Allergen Reactions   • Adhesive Tape Rash       History reviewed. No pertinent surgical history.    Family History   Problem Relation Age of Onset   • Cancer Mother    • Hyperlipidemia Mother    • Hypertension Mother    • Obesity Mother    • Stroke Father    • Hypertension Father    • Heart disease Father    • Depression Sister    • Depression Brother    • Cancer Maternal Grandmother    • Hyperlipidemia Maternal Grandmother    • Hypertension Maternal Grandmother    • Obesity Maternal Grandmother    • Cancer Maternal Grandfather    • Hyperlipidemia Maternal Grandfather    • Hypertension Maternal Grandfather    • Obesity Maternal Grandfather    • Depression Sister    • Depression Sister    • Depression Brother    • Depression Brother        Social History     Socioeconomic History   • Marital status: Single     Spouse name: Not on file   • Number of children: Not on file   • Years of education: Not on file   • Highest education level: Not on  file   Tobacco Use   • Smoking status: Current Every Day Smoker     Packs/day: 0.25   • Smokeless tobacco: Never Used   Substance and Sexual Activity   • Alcohol use: No   • Drug use: Yes     Types: Marijuana   • Sexual activity: Yes     Partners: Female           Objective   Physical Exam   Constitutional: She appears well-developed and well-nourished.   Nursing note and vitals reviewed.  GEN: No acute distress  Head: Normocephalic, atraumatic  Eyes: Pupils equal round reactive to light, slightly red in appearance no drainage or crusting lashes.  ENT: Posterior pharynx normal in appearance, oral mucosa is moist  Chest: Nontender to palpation  Cardiovascular: Regular rate  Lungs: Clear to auscultation bilaterally  Abdomen: Soft, nontender, nondistended, no peritoneal signs  Extremities: No edema, normal appearance  Neuro: GCS 15  Psych: Mood and affect are appropriate      Procedures           ED Course                  MDM  Number of Diagnoses or Management Options  Allergic conjunctivitis of both eyes and rhinitis:      Amount and/or Complexity of Data Reviewed  Review and summarize past medical records: yes  Discuss the patient with other providers: yes    Risk of Complications, Morbidity, and/or Mortality  Presenting problems: low  Diagnostic procedures: minimal  Management options: low        Final diagnoses:   Allergic conjunctivitis of both eyes and rhinitis              Cindy Aly, APRN  10/23/19 3412

## 2019-11-13 ENCOUNTER — PROCEDURE VISIT (OUTPATIENT)
Dept: OBSTETRICS AND GYNECOLOGY | Facility: CLINIC | Age: 29
End: 2019-11-13

## 2019-11-13 VITALS
BODY MASS INDEX: 26.63 KG/M2 | HEIGHT: 70 IN | DIASTOLIC BLOOD PRESSURE: 82 MMHG | WEIGHT: 186 LBS | SYSTOLIC BLOOD PRESSURE: 124 MMHG

## 2019-11-13 DIAGNOSIS — Z01.419 WELL WOMAN EXAM WITH ROUTINE GYNECOLOGICAL EXAM: Primary | ICD-10-CM

## 2019-11-13 DIAGNOSIS — Z11.3 ROUTINE SCREENING FOR STI (SEXUALLY TRANSMITTED INFECTION): ICD-10-CM

## 2019-11-13 DIAGNOSIS — Z12.4 SCREENING FOR MALIGNANT NEOPLASM OF CERVIX: ICD-10-CM

## 2019-11-13 PROCEDURE — 99385 PREV VISIT NEW AGE 18-39: CPT | Performed by: OBSTETRICS & GYNECOLOGY

## 2019-11-13 NOTE — PROGRESS NOTES
Subjective   Chief Complaint   Patient presents with   • Gynecologic Exam     Patient is here to establish care, she states her last pap has been over 3 years. She would like to discuss getting pregnant.     Linette Hanley is a 29 y.o. year old  presenting to be seen for an annual well woman visit.    She has no specific complaints today.  She would like to discuss her chances for future fertility.  She reports regular, monthly menses lasting 6 days with mild pain symptoms.  She does report a history of chlamydia, but no PID.  Her partner is currently incarcerated and they would like to attempt pregnancy roughly 6 months from now.  He is young with no chronic medical problems.    She denies sexual dysfunction. She denies urinary complaints.    OB Hx: One term vaginal birth  Contraception: None currently  Pap smear: Roughly 3 years ago, denies abnormal results requiring treatment  Mammogram: Never  Colonoscopy: Never  DEXA Scan: Never    She denies nausea, emesis, fevers, chills, significant weight changes, hair/skin/nail changes, dysuria, urinary frequency, palpitations, chest pain, headaches, myalgia, dyspnea.     History  Past Medical History:   Diagnosis Date   • Abnormal Pap smear of cervix    • Asthma    • Bipolar depression (CMS/[facility])    • Depression    • Hypertension    , Past Surgical History:   Procedure Laterality Date   • ADENOIDECTOMY     • COLPOSCOPY W/ BIOPSY / CURETTAGE     , Family History   Problem Relation Age of Onset   • Cancer Mother    • Hyperlipidemia Mother    • Hypertension Mother    • Obesity Mother    • Stroke Father    • Hypertension Father    • Heart disease Father    • Depression Sister    • Depression Brother    • Cancer Maternal Grandmother    • Hyperlipidemia Maternal Grandmother    • Hypertension Maternal Grandmother    • Obesity Maternal Grandmother    • Cancer Maternal Grandfather    • Hyperlipidemia Maternal Grandfather    • Hypertension Maternal Grandfather    • Obesity  Maternal Grandfather    • Depression Sister    • Depression Sister    • Depression Brother    • Depression Brother    , Social History     Tobacco Use   • Smoking status: Current Every Day Smoker     Packs/day: 0.50   • Smokeless tobacco: Never Used   Substance Use Topics   • Alcohol use: No   • Drug use: Yes     Types: Marijuana   ,   (Not in a hospital admission) and Allergies:  Adhesive tape    Current Outpatient Medications on File Prior to Visit   Medication Sig Dispense Refill   • [DISCONTINUED] buPROPion XL (WELLBUTRIN XL) 300 MG 24 hr tablet Take 1 tablet by mouth Daily. 30 tablet 2   • [DISCONTINUED] busPIRone (BUSPAR) 10 MG tablet Take 1 tablet by mouth 3 (Three) Times a Day. 90 tablet 2   • [DISCONTINUED] cetirizine (zyrTEC) 10 MG tablet Take 1 tablet by mouth Daily. 30 tablet 0   • [DISCONTINUED] cyclobenzaprine (FLEXERIL) 10 MG tablet take 1 tablet place twice a day if needed for muscle spasm 30 tablet 0   • [DISCONTINUED] ibuprofen (ADVIL,MOTRIN) 600 MG tablet Take 1 tablet by mouth Every 6 (Six) Hours As Needed for mild pain (1-3). 28 tablet 0   • [DISCONTINUED] loperamide (IMODIUM) 2 MG capsule Take 1 capsule by mouth 4 (Four) Times a Day As Needed for Diarrhea. 20 capsule 0   • [DISCONTINUED] olopatadine (PATANOL) 0.1 % ophthalmic solution Administer 1 drop to both eyes 2 (Two) Times a Day. 5 mL 0   • [DISCONTINUED] OXcarbazepine (TRILEPTAL) 300 MG tablet Take 1 tablet by mouth 2 (Two) Times a Day. 60 tablet 2   • [DISCONTINUED] propranolol LA (INDERAL LA) 160 MG 24 hr capsule Take  by mouth Daily.     • [DISCONTINUED] vilazodone (VIIBRYD) 20 MG tablet tablet Take 20 mg by mouth Daily.       No current facility-administered medications on file prior to visit.        Social History    Tobacco Use      Smoking status: Current Every Day Smoker        Packs/day: 0.50      Smokeless tobacco: Never Used      Review of Systems  Pertinent items are noted in HPI, all other systems were reviewed and  "negative       Objective   /82   Ht 177.8 cm (70\")   Wt 84.4 kg (186 lb)   LMP 10/21/2019   BMI 26.69 kg/m²     Physical Exam:  General Appearance: alert, pleasant, appears stated age, interactive and cooperative  Head: normocephalic, without obvious abnormality and atraumatic  Eyes: lids and lashes normal and no icterus  Ears: ears appear intact with no abnormalities noted  Nose: nares normal, septum midline, mucosa normal and no drainage  Neck: suppple, trachea midline and no thyromegaly  Back: no kyphosis present, no scoliosis present and range of motion normal  Lungs: respirations regular, respirations even and respirations unlabored, clear to auscultation bilaterally   Heart: regular rhythm and normal rate, normal S1, S2, no murmur, gallop, or rubs and no click  Breasts: Not performed.  Abdomen: no masses, no hepatomegaly, no splenomegaly, soft non-tender, no guarding and no rebound tenderness  Extremities: moves extremities well, no edema, no cyanosis and no redness  Skin: no bleeding, bruising or rash and no lesions noted  Lymph Nodes: no palpable adenopathy  Neurologic: Cranial Nerves cranial nerves 2 - 12 grossly intact, Speech normal content and execusion, Coordination normal  Psych: normal mood and affect, oriented to person, time and place, thought content organized and appropriate judgment    Pelvis:  Pelvic: Clinical staff was present for exam  External genitalia:  normal appearance of the external genitalia including Bartholin's and [place]'s glands.  :  urethral meatus normal;  Vagina:  normal pink mucosa without prolapse or lesions. discharge present -  white;  Cervix:  normal appearance.  Uterus:  normal size, shape and consistency.  Adnexa:  normal bimanual exam of the adnexa.  Rectal:  digital rectal exam not performed; anus visually normal appearing.       Assessment   Annual well woman exam with age appropriate screening  Preconception counseling     Plan    Orders Placed This " Encounter   Procedures   • NuSwab VG+ - Swab, Cervix   • HIV-1 / O / 2 Ag / Antibody 4th Generation   • HCV Antibody Rfx To Qnt PCR   • Hepatitis B Surface Antigen   • RPR     Medications ordered: none    Procedures performed: pap smear    - Mammogram: Not indicated   - Pap screening guidelines reviewed; pap smear collected today  - Yearly clinical breast and pelvic exams recommended regardless of pap recommendations  - Dexa scan: not indicated   - Colonoscopy: not indicated  - Healthy diet and exercise encouraged  - Calcium and Vitamin D requirements reviewed  - Contraception: none  - Screening: STI screening  - Seat belt use encouraged  - We reviewed the concept of timed intercourse.  I recommend starting prenatal vitamins roughly 1 month prior to attempts at conception.    Follow up for annual visits or new OB visit.    Doyle Gu MD  Obstetrics and Gynecology  [facility]

## 2019-11-16 LAB
A VAGINAE DNA VAG QL NAA+PROBE: ABNORMAL SCORE
BVAB2 DNA VAG QL NAA+PROBE: ABNORMAL SCORE
C ALBICANS DNA VAG QL NAA+PROBE: NEGATIVE
C GLABRATA DNA VAG QL NAA+PROBE: NEGATIVE
C TRACH RRNA SPEC QL NAA+PROBE: POSITIVE
DIAGNOSTIC IMP SPEC-IMP: NORMAL
HBV SURFACE AG SERPL QL IA: NEGATIVE
HCV AB S/CO SERPL IA: 2.2 S/CO RATIO (ref 0–0.9)
HCV RNA SERPL NAA+PROBE-ACNC: NORMAL IU/ML
HIV 1+2 AB+HIV1 P24 AG SERPL QL IA: NON REACTIVE
MEGA1 DNA VAG QL NAA+PROBE: ABNORMAL SCORE
N GONORRHOEA RRNA SPEC QL NAA+PROBE: NEGATIVE
REF LAB TEST REF RANGE: NORMAL
RPR SER QL: NON REACTIVE
T VAGINALIS RRNA SPEC QL NAA+PROBE: NEGATIVE

## 2019-11-19 DIAGNOSIS — A74.9 CHLAMYDIA: ICD-10-CM

## 2019-11-19 DIAGNOSIS — B96.89 BV (BACTERIAL VAGINOSIS): Primary | ICD-10-CM

## 2019-11-19 DIAGNOSIS — N76.0 BV (BACTERIAL VAGINOSIS): Primary | ICD-10-CM

## 2019-11-19 RX ORDER — METRONIDAZOLE 500 MG/1
500 TABLET ORAL 2 TIMES DAILY
Qty: 14 TABLET | Refills: 0 | Status: SHIPPED | OUTPATIENT
Start: 2019-11-19 | End: 2019-11-26

## 2019-11-19 RX ORDER — AZITHROMYCIN 500 MG/1
1000 TABLET, FILM COATED ORAL ONCE
Qty: 2 TABLET | Refills: 0 | Status: SHIPPED | OUTPATIENT
Start: 2019-11-19 | End: 2019-11-19

## 2019-11-20 DIAGNOSIS — Z12.4 SCREENING FOR MALIGNANT NEOPLASM OF CERVIX: ICD-10-CM

## 2019-12-13 ENCOUNTER — APPOINTMENT (OUTPATIENT)
Dept: CT IMAGING | Facility: HOSPITAL | Age: 29
End: 2019-12-13

## 2019-12-13 ENCOUNTER — HOSPITAL ENCOUNTER (EMERGENCY)
Facility: HOSPITAL | Age: 29
Discharge: HOME | End: 2019-12-13
Attending: EMERGENCY MEDICINE | Admitting: EMERGENCY MEDICINE

## 2019-12-13 VITALS
WEIGHT: 185 LBS | TEMPERATURE: 98 F | SYSTOLIC BLOOD PRESSURE: 135 MMHG | OXYGEN SATURATION: 99 % | DIASTOLIC BLOOD PRESSURE: 78 MMHG | HEART RATE: 77 BPM | BODY MASS INDEX: 26.54 KG/M2 | RESPIRATION RATE: 16 BRPM

## 2019-12-13 DIAGNOSIS — M54.2 NECK PAIN: ICD-10-CM
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
## 2019-12-13 DIAGNOSIS — S09.90XA CLOSED HEAD INJURY, INITIAL ENCOUNTER: Primary | ICD-10-CM

## 2019-12-13 PROCEDURE — 99284 EMERGENCY DEPT VISIT MOD MDM: CPT

## 2019-12-13 PROCEDURE — 70450 CT HEAD/BRAIN W/O DYE: CPT

## 2019-12-13 RX ORDER — MELOXICAM 7.5 MG/1
7.5 TABLET ORAL DAILY
Qty: 14 TABLET | Refills: 0 | OUTPATIENT
Start: 2019-12-13 | End: 2021-02-02

## 2019-12-13 RX ORDER — METHOCARBAMOL 500 MG/1
1500 TABLET, FILM COATED ORAL ONCE
Status: COMPLETED | OUTPATIENT
Start: 2019-12-13 | End: 2019-12-13

## 2019-12-13 RX ORDER — MELOXICAM 7.5 MG/1
7.5 TABLET ORAL DAILY
Status: DISCONTINUED | OUTPATIENT
Start: 2019-12-13 | End: 2019-12-13 | Stop reason: HOSPADM

## 2019-12-13 RX ORDER — METHOCARBAMOL 750 MG/1
750 TABLET, FILM COATED ORAL 3 TIMES DAILY PRN
Qty: 21 TABLET | Refills: 0 | OUTPATIENT
Start: 2019-12-13 | End: 2021-02-02

## 2019-12-13 RX ADMIN — METHOCARBAMOL TABLETS 1500 MG: 500 TABLET, COATED ORAL at 16:15

## 2019-12-13 RX ADMIN — MELOXICAM 7.5 MG: 7.5 TABLET ORAL at 16:15
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
## 2019-12-14 ENCOUNTER — APPOINTMENT (OUTPATIENT)
Dept: GENERAL RADIOLOGY | Facility: HOSPITAL | Age: 29
End: 2019-12-14

## 2019-12-14 ENCOUNTER — HOSPITAL ENCOUNTER (EMERGENCY)
Facility: HOSPITAL | Age: 29
Discharge: HOME | End: 2019-12-15
Attending: EMERGENCY MEDICINE | Admitting: EMERGENCY MEDICINE

## 2019-12-14 VITALS
TEMPERATURE: 98 F | WEIGHT: 188.4 LBS | OXYGEN SATURATION: 100 % | SYSTOLIC BLOOD PRESSURE: 125 MMHG | RESPIRATION RATE: 18 BRPM | BODY MASS INDEX: 26.97 KG/M2 | HEART RATE: 89 BPM | HEIGHT: 70 IN | DIASTOLIC BLOOD PRESSURE: 85 MMHG

## 2019-12-14 DIAGNOSIS — M54.9 BACK PAIN, UNSPECIFIED BACK LOCATION, UNSPECIFIED BACK PAIN LATERALITY, UNSPECIFIED CHRONICITY: Primary | ICD-10-CM
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
## 2019-12-14 PROCEDURE — 71101 X-RAY EXAM UNILAT RIBS/CHEST: CPT

## 2019-12-14 PROCEDURE — 25010000002 MORPHINE PER 10 MG: Performed by: EMERGENCY MEDICINE

## 2019-12-14 PROCEDURE — 99282 EMERGENCY DEPT VISIT SF MDM: CPT

## 2019-12-14 PROCEDURE — 96372 THER/PROPH/DIAG INJ SC/IM: CPT

## 2019-12-14 PROCEDURE — 72040 X-RAY EXAM NECK SPINE 2-3 VW: CPT

## 2019-12-14 PROCEDURE — 72070 X-RAY EXAM THORAC SPINE 2VWS: CPT

## 2019-12-14 RX ORDER — MORPHINE SULFATE 4 MG/ML
4 INJECTION, SOLUTION INTRAMUSCULAR; INTRAVENOUS ONCE
Status: COMPLETED | OUTPATIENT
Start: 2019-12-14 | End: 2019-12-14

## 2019-12-14 RX ADMIN — MORPHINE SULFATE 4 MG: 4 INJECTION INTRAVENOUS at 23:53
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
## 2020-08-22 ENCOUNTER — E-VISIT (OUTPATIENT)
Dept: FAMILY MEDICINE CLINIC | Facility: TELEHEALTH | Age: 30
End: 2020-08-22

## 2020-08-22 ENCOUNTER — TELEMEDICINE (OUTPATIENT)
Dept: FAMILY MEDICINE CLINIC | Facility: TELEHEALTH | Age: 30
End: 2020-08-22

## 2020-08-22 DIAGNOSIS — Z20.2 SEXUALLY TRANSMITTED DISEASE EXPOSURE: Primary | ICD-10-CM

## 2020-08-22 PROCEDURE — 99212 OFFICE O/P EST SF 10 MIN: CPT | Performed by: NURSE PRACTITIONER

## 2020-08-22 NOTE — PROGRESS NOTES
CHIEF COMPLAINT  Chief Complaint   Patient presents with   • Exposure to STD   • Urinary Tract Infection         HPI  Linette Hanley is a 29 y.o. female  presents with complaint of white/grayis thin foul smelling vaginal discharge with pelvic and back pain. She wants treatment for uti, vaginal yeast infection, BV and chlamydia. She took medications in November for chlamydia and BV and reports she did not take 2 of the pills, but does not know what the name of the pills were. She reports she has had no sexual intercourse since she was treated.     Review of Systems   Constitutional: Negative for fever.   Genitourinary: Positive for dysuria, pelvic pain and vaginal discharge. Negative for decreased urine volume, difficulty urinating, dyspareunia, enuresis, flank pain, frequency, genital sores, hematuria, menstrual problem, urgency, vaginal bleeding and vaginal pain.        Foul odor and back pain        Past Medical History:   Diagnosis Date   • Abnormal Pap smear of cervix    • Asthma    • Bipolar depression (CMS/[facility])    • Chlamydia 11/13/2019   • Depression    • Hepatitis C antibody positive in blood 11/13/2019   • Hypertension        Family History   Problem Relation Age of Onset   • Cancer Mother    • Hyperlipidemia Mother    • Hypertension Mother    • Obesity Mother    • Stroke Father    • Hypertension Father    • Heart disease Father    • Depression Sister    • Depression Brother    • Cancer Maternal Grandmother    • Hyperlipidemia Maternal Grandmother    • Hypertension Maternal Grandmother    • Obesity Maternal Grandmother    • Cancer Maternal Grandfather    • Hyperlipidemia Maternal Grandfather    • Hypertension Maternal Grandfather    • Obesity Maternal Grandfather    • Depression Sister    • Depression Sister    • Depression Brother    • Depression Brother        Social History     Socioeconomic History   • Marital status: Single     Spouse name: Not on file   • Number of children: Not on file   • Years of  education: Not on file   • Highest education level: Not on file   Tobacco Use   • Smoking status: Current Every Day Smoker     Packs/day: 0.50   • Smokeless tobacco: Never Used   Substance and Sexual Activity   • Alcohol use: No   • Drug use: Yes     Types: Marijuana   • Sexual activity: Not Currently     Partners: Female, Male         There were no vitals taken for this visit.    PHYSICAL EXAM  Physical Exam   Constitutional: She is oriented to person, place, and time. She appears well-developed and well-nourished. She does not have a sickly appearance. She does not appear ill. No distress.   HENT:   Head: Normocephalic and atraumatic.   Eyes: EOM are normal.   Neck: Neck normal appearance.  Pulmonary/Chest: Effort normal.  No respiratory distress.  Neurological: She is alert and oriented to person, place, and time.   Skin: Skin is dry.   Psychiatric: Her speech is normal and behavior is normal. Judgment and thought content normal. She mood appears anxious.       Results for orders placed or performed in visit on 11/13/19   NuSwab VG+ - Swab, Cervix   Result Value Ref Range    Atopobium Vaginae High - 2 (A) Score    BVAB 2 High - 2 (A) Score    Megasphaera 1 High - 2 (A) Score    Jazmín Albicans, MALINI Negative Negative    Candida Glabrata, MALINI Negative Negative    Trichomonas vaginosis Negative Negative    Chlamydia trachomatis, MALINI Positive (A) Negative    Neisseria gonorrhoeae, MALINI Negative Negative   HIV-1 / O / 2 Ag / Antibody 4th Generation   Result Value Ref Range    HIV Screen 4th Gen w/RFX (Reference) Non Reactive Non Reactive   HCV Antibody Rfx To Qnt PCR   Result Value Ref Range    Hepatitis C Ab 2.2 (H) 0.0 - 0.9 s/co ratio   Hepatitis B Surface Antigen   Result Value Ref Range    Hepatitis B Surface Ag Negative Negative   RPR   Result Value Ref Range    RPR Non Reactive Non Reactive   HCV RT-PCR Quant (Non-Graph)   Result Value Ref Range    Hepatitis C Quantitation HCV Not Detected IU/mL    HCV Test  Information Comment     HCV Interpretation Comment        Linette was seen today for exposure to std and urinary tract infection.    Diagnoses and all orders for this visit:    Sexually transmitted disease exposure    Reviewed labs from 11/19/2019 visit.   She reports no sexual contact since November, but she walked out of her house and got in her care at the start of the visit. 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 She seems anxious.     Linette was instructed to go to urgent care today for sti screening. Symptoms consistent with BV or chlamydia. Similar to symptoms on 11/19/2019 when she has positive screening for both.     Patient verbalizes understanding of medication dosage, comfort measures, instructions for treatment and follow-up.    RUFUS Connolly  08/22/2020  12:46 PM    This visit was performed via Telehealth.  This patient has been instructed to follow-up with their primary care provider if their symptoms worsen or the treatment provided does not resolve their illness.

## 2020-08-22 NOTE — PATIENT INSTRUCTIONS
I would like for you to follow up today at urgent care for screening for sexually transmitted disease.   Symptoms are similar to previous symptoms in November 2019 when you were diagnosed with BV and Chlamydia.         Chlamydia, Female    Chlamydia is a STD (sexually transmitted disease). This is an infection that spreads through sexual contact. If it is not treated, it can cause serious problems. It must be treated with antibiotic medicine.  If this infection is not treated and you are pregnant or become pregnant, your baby could get it during delivery. This may cause bad health problems for the baby.  Sometimes, you may not have symptoms (asymptomatic). When you have symptoms, they can include:  · Burning when you pee (urinate).  · Peeing often.  · Fluid (discharge) coming from the vagina.  · Redness, soreness, and swelling (inflammation) of the butt (rectum).  · Bleeding or fluid coming from the butt.  · Belly (abdominal) pain.  · Pain during sex.  · Bleeding between periods.  · Itching, burning, or redness in the eyes.  · Fluid coming from the eyes.  Follow these instructions at home:  Medicines  · Take over-the-counter and prescription medicines only as told by your doctor.  · Take your antibiotic medicine as told by your doctor. Do not stop taking the antibiotic even if you start to feel better.  Sexual activity  · Tell sex partners about your infection. Sex partners are people you had oral, anal, or vaginal sex with within 60 days of when you started getting sick. They need treatment, too.  · Do not have sex until:  ? You and your sex partners have been treated.  ? Your doctor says it is okay.  · If you have a single dose treatment, wait 7 days before having sex.  General instructions  · It is up to you to get your test results. Ask your doctor when your results will be ready.  · Get a lot of rest.  · Eat healthy foods.  · Drink enough fluid to keep your pee (urine) clear or pale yellow.  · Keep all  follow-up visits as told by your doctor. You may need tests after 3 months.  Preventing chlamydia  · The only way to prevent chlamydia is not to have sex. To lower your risk:  ? Use latex condoms correctly. Do this every time you have sex.  ? Avoid having many sex partners.  ? Ask if your partner has been tested for STDs and if he or she had negative results.  Contact a doctor if:  · You get new symptoms.  · You do not get better with treatment.  · You have a fever or chills.  · You have pain during sex.  Get help right away if:  · Your pain gets worse and does not get better with medicine.  · You get flu-like symptoms, such as:  ? Night sweats.  ? Sore throat.  ? Muscle aches.  · You feel sick to your stomach (nauseous).  · You throw up (vomit).  · You have trouble swallowing.  · You have bleeding:  ? Between periods.  ? After sex.  · You have irregular periods.  · You have belly pain that does not get better with medicine.  · You have lower back pain that does not get better with medicine.  · You feel weak or dizzy.  · You pass out (faint).  · You are pregnant and you get symptoms of chlamydia.  Summary  · Chlamydia is an infection that spreads through sexual contact.  · Sometimes, chlamydia can cause no symptoms (asymptomatic).  · Do not have sex until your doctor says it is okay.  · All sex partners will have to be treated for chlamydia.  This information is not intended to replace advice given to you by your health care provider. Make sure you discuss any questions you have with your health care provider.  Document Released: 09/26/2009 Document Revised: 06/11/2019 Document Reviewed: 12/07/2017  [employer] Patient Education © 2020 [employer]  Bacterial Vaginosis    Bacterial vaginosis is an infection of the vagina. It happens when too many normal germs (healthy bacteria) grow in the vagina. This infection puts you at risk for infections from sex (STIs). Treating this infection can lower your risk for some STIs.  You should also treat this if you are pregnant. It can cause your baby to be born early.  Follow these instructions at home:  Medicines  · Take over-the-counter and prescription medicines only as told by your doctor.  · Take or use your antibiotic medicine as told by your doctor. Do not stop taking or using it even if you start to feel better.  General instructions  · If you your sexual partner is a woman, tell her that you have this infection. She needs to get treatment if she has symptoms. If you have a male partner, he does not need to be treated.  · During treatment:  ? Avoid sex.  ? Do not douche.  ? Avoid alcohol as told.  ? Avoid breastfeeding as told.  · Drink enough fluid to keep your pee (urine) clear or pale yellow.  · Keep your vagina and butt (rectum) clean.  ? Wash the area with warm water every day.  ? Wipe from front to back after you use the toilet.  · Keep all follow-up visits as told by your doctor. This is important.  Preventing this condition  · Do not douche.  · Use only warm water to wash around your vagina.  · Use protection when you have sex. This includes:  ? Latex condoms.  ? Dental dams.  · Limit how many people you have sex with. It is best to only have sex with the same person (be monogamous).  · Get tested for STIs. Have your partner get tested.  · Wear underwear that is cotton or lined with cotton.  · Avoid tight pants and pantyhose. This is most important in summer.  · Do not use any products that have nicotine or tobacco in them. These include cigarettes and e-cigarettes. If you need help quitting, ask your doctor.  · Do not use illegal drugs.  · Limit how much alcohol you drink.  Contact a doctor if:  · Your symptoms do not get better, even after you are treated.  · You have more discharge or pain when you pee (urinate).  · You have a fever.  · You have pain in your belly (abdomen).  · You have pain with sex.  · Your bleed from your vagina between periods.  Summary  · This  infection happens when too many germs (bacteria) grow in the vagina.  · Treating this condition can lower your risk for some infections from sex (STIs).  · You should also treat this if you are pregnant. It can cause early (premature) birth.  · Do not stop taking or using your antibiotic medicine even if you start to feel better.  This information is not intended to replace advice given to you by your health care provider. Make sure you discuss any questions you have with your health care provider.  Document Released: 09/26/2009 Document Revised: 11/30/2018 Document Reviewed: 09/02/2017  Elsevier Patient Education © 2020 [employer]

## 2021-05-12 ENCOUNTER — HOSPITAL ENCOUNTER (EMERGENCY)
Facility: HOSPITAL | Age: 31
Discharge: HOME | End: 2021-05-12
Attending: EMERGENCY MEDICINE | Admitting: EMERGENCY MEDICINE

## 2021-05-12 VITALS
WEIGHT: 248.6 LBS | HEART RATE: 96 BPM | BODY MASS INDEX: 36.82 KG/M2 | HEIGHT: 69 IN | TEMPERATURE: 98 F | DIASTOLIC BLOOD PRESSURE: 65 MMHG | OXYGEN SATURATION: 100 % | SYSTOLIC BLOOD PRESSURE: 126 MMHG | RESPIRATION RATE: 18 BRPM

## 2021-05-12 DIAGNOSIS — Z00.00 WELLNESS EXAMINATION: Primary | ICD-10-CM

## 2021-05-12 PROCEDURE — 99282 EMERGENCY DEPT VISIT SF MDM: CPT

## 2021-05-12 RX ORDER — PRENATAL VIT/IRON FUM/FOLIC AC 27MG-0.8MG
1 TABLET ORAL DAILY
COMMUNITY

## 2022-07-14 DIAGNOSIS — Z12.4 SCREENING FOR MALIGNANT NEOPLASM OF CERVIX: Primary | ICD-10-CM

## 2022-07-14 DIAGNOSIS — O36.80X0 ENCOUNTER TO DETERMINE FETAL VIABILITY OF PREGNANCY, SINGLE OR UNSPECIFIED FETUS: ICD-10-CM

## 2022-07-29 ENCOUNTER — INITIAL PRENATAL (OUTPATIENT)
Dept: OBSTETRICS AND GYNECOLOGY | Facility: CLINIC | Age: 32
End: 2022-07-29

## 2022-07-29 VITALS — BODY MASS INDEX: 44.15 KG/M2 | SYSTOLIC BLOOD PRESSURE: 126 MMHG | WEIGHT: 293 LBS | DIASTOLIC BLOOD PRESSURE: 76 MMHG

## 2022-07-29 DIAGNOSIS — Z34.82 ENCOUNTER FOR SUPERVISION OF OTHER NORMAL PREGNANCY IN SECOND TRIMESTER: Primary | ICD-10-CM

## 2022-07-29 DIAGNOSIS — Z11.3 SCREENING EXAMINATION FOR STD (SEXUALLY TRANSMITTED DISEASE): ICD-10-CM

## 2022-07-29 DIAGNOSIS — O09.899 SHORT INTERVAL BETWEEN PREGNANCIES AFFECTING PREGNANCY, ANTEPARTUM: ICD-10-CM

## 2022-07-29 DIAGNOSIS — O36.80X0 ENCOUNTER TO DETERMINE FETAL VIABILITY OF PREGNANCY, SINGLE OR UNSPECIFIED FETUS: ICD-10-CM

## 2022-07-29 PROCEDURE — 99214 OFFICE O/P EST MOD 30 MIN: CPT | Performed by: MIDWIFE

## 2022-07-29 RX ORDER — FAMOTIDINE 20 MG/1
20 TABLET, FILM COATED ORAL 2 TIMES DAILY PRN
Qty: 60 TABLET | Refills: 2 | Status: SHIPPED | OUTPATIENT
Start: 2022-07-29 | End: 2023-07-29

## 2022-07-29 NOTE — PROGRESS NOTES
Subjective     Chief Complaint   Patient presents with   • Initial Prenatal Visit     New OB, unsure of LMP,recent delivery     , patient last pap was 2019, No Complaints/concerns         Linette Hanley is a 31 y.o. .  No LMP recorded. Patient is pregnant..  She presents to be seen to initiate prenatal care with our practice. She has had 2 uncomplicated pregnancies with . Her last delivery was 21. She has been having nausea and vomiting most mornings. She has also had a lot of heartburn for which she uses TUMs with minimal relief.    The following portions of the patient's history were reviewed and updated as appropriate:vital signs, allergies, current medications, past medical history, past social history, past surgical history and problem list.    Review of Systems -   /76   Wt 136 kg (299 lb)   BMI 44.15 kg/m²    Gastrointestinal: nausea and vomiting, heartburn, denies constipation  Genitourinary: denies frequency, urgency, or burning with urination  All other systems were reviewed and are negative    Objective     Physical Exam  Constitutional   The patient is awake, alert, well developed, well nourished and well groomed.   Neck   The neck is supple and the trachea is midline. The thyroid is not enlarged and there are no palpable nodules.    Respiratory  The patient is relaxed and breathes without effort.   Lungs CTAB  Cardiovascular  Normal rate and rhythm is regular without murmur -  Negative LE pitting edema  Gastrointestinal   The abdomen is gravid - soft - non tender.  No umbilical hernia  Genitourinary   - External Genitalia without erythema, lesions, or masses  -Vagina - There is no abnormal vaginal discharge.   -Cervix is without cervical motion tenderness   Uterus - S=D  Musculoskeletal  Normal gait, no joint pain or swelling  Extremities  Full ROM. No cyanosis or edema  Psychiatric  The patient is oriented to person, place, and time. Maintains eye contact     Imaging    Anatomy US-all anatomy seen  27w3d, 64%, HC 36%, AC 63%, GRIFFIN 15.4, transverse    Assessment & Plan     ASSESSMENT  1. IUP at 27w3d  2.   Normal pregnancy  3.   Closely spaced pregnancies  4.   Late entry to care    PLAN  1. Tests ordered today (option for genetic screening info given if appropriate):   Orders Placed This Encounter   Procedures   • NuSwab VG+ - Swab, Vagina     Order Specific Question:   Release to patient     Answer:   Immediate   • OB Panel With HIV     Order Specific Question:   Release to patient     Answer:   Immediate   • Gestational Screen 1 Hr ([facility])     Standing Status:   Future     Standing Expiration Date:   7/29/2023     Order Specific Question:   Release to patient     Answer:   Immediate     2. Medications prescribed today:  New Medications Ordered This Visit   Medications   • famotidine (Pepcid) 20 MG tablet     Sig: Take 1 tablet by mouth 2 (Two) Times a Day As Needed for Heartburn.     Dispense:  60 tablet     Refill:  2     3. Information reviewed:exercise in pregnancy, nutrition in pregnancy, weight gain in pregnancy, work and travel restrictions during pregnancy, list of OTC medications acceptable in pregnancy and call coverage groups    Follow up: 2 week(s)         This note was electronically signed.    Lizzy Kincaid CNM  July 29, 2022

## 2022-08-01 LAB
A VAGINAE DNA VAG QL NAA+PROBE: ABNORMAL SCORE
ABO GROUP BLD: ABNORMAL
BASOPHILS # BLD AUTO: 0 X10E3/UL (ref 0–0.2)
BASOPHILS NFR BLD AUTO: 0 %
BLD GP AB SCN SERPL QL: NEGATIVE
BVAB2 DNA VAG QL NAA+PROBE: ABNORMAL SCORE
C ALBICANS DNA VAG QL NAA+PROBE: NEGATIVE
C GLABRATA DNA VAG QL NAA+PROBE: NEGATIVE
C TRACH DNA VAG QL NAA+PROBE: NEGATIVE
EOSINOPHIL # BLD AUTO: 0.1 X10E3/UL (ref 0–0.4)
EOSINOPHIL NFR BLD AUTO: 1 %
ERYTHROCYTE [DISTWIDTH] IN BLOOD BY AUTOMATED COUNT: 13.1 % (ref 11.7–15.4)
HBV SURFACE AG SERPL QL IA: NEGATIVE
HCT VFR BLD AUTO: 35.7 % (ref 34–46.6)
HCV AB S/CO SERPL IA: 1 S/CO RATIO (ref 0–0.9)
HGB BLD-MCNC: 12.4 G/DL (ref 11.1–15.9)
HIV 1+2 AB+HIV1 P24 AG SERPL QL IA: NON REACTIVE
IMM GRANULOCYTES # BLD AUTO: 0.1 X10E3/UL (ref 0–0.1)
IMM GRANULOCYTES NFR BLD AUTO: 1 %
LYMPHOCYTES # BLD AUTO: 1.9 X10E3/UL (ref 0.7–3.1)
LYMPHOCYTES NFR BLD AUTO: 15 %
MCH RBC QN AUTO: 30.6 PG (ref 26.6–33)
MCHC RBC AUTO-ENTMCNC: 34.7 G/DL (ref 31.5–35.7)
MCV RBC AUTO: 88 FL (ref 79–97)
MEGA1 DNA VAG QL NAA+PROBE: ABNORMAL SCORE
MONOCYTES # BLD AUTO: 0.7 X10E3/UL (ref 0.1–0.9)
MONOCYTES NFR BLD AUTO: 5 %
N GONORRHOEA DNA VAG QL NAA+PROBE: NEGATIVE
NEUTROPHILS # BLD AUTO: 9.8 X10E3/UL (ref 1.4–7)
NEUTROPHILS NFR BLD AUTO: 78 %
PLATELET # BLD AUTO: 368 X10E3/UL (ref 150–450)
RBC # BLD AUTO: 4.05 X10E6/UL (ref 3.77–5.28)
RH BLD: POSITIVE
RPR SER QL: NON REACTIVE
RUBV IGG SERPL IA-ACNC: 0.94 INDEX
T VAGINALIS DNA VAG QL NAA+PROBE: NEGATIVE
WBC # BLD AUTO: 12.8 X10E3/UL (ref 3.4–10.8)

## 2022-08-09 RX ORDER — METRONIDAZOLE 500 MG/1
500 TABLET ORAL 2 TIMES DAILY
Qty: 14 TABLET | Refills: 0 | Status: SHIPPED | OUTPATIENT
Start: 2022-08-09 | End: 2022-08-16

## 2022-08-10 DIAGNOSIS — O98.419 ACUTE HEPATITIS C COMPLICATING PREGNANCY, ANTEPARTUM: Primary | ICD-10-CM

## 2022-08-10 DIAGNOSIS — B17.10 ACUTE HEPATITIS C COMPLICATING PREGNANCY, ANTEPARTUM: Primary | ICD-10-CM

## 2022-08-12 ENCOUNTER — ROUTINE PRENATAL (OUTPATIENT)
Dept: OBSTETRICS AND GYNECOLOGY | Facility: CLINIC | Age: 32
End: 2022-08-12

## 2022-08-12 VITALS — DIASTOLIC BLOOD PRESSURE: 74 MMHG | WEIGHT: 293 LBS | SYSTOLIC BLOOD PRESSURE: 130 MMHG | BODY MASS INDEX: 45.04 KG/M2

## 2022-08-12 DIAGNOSIS — O09.899 SHORT INTERVAL BETWEEN PREGNANCIES AFFECTING PREGNANCY, ANTEPARTUM: Primary | ICD-10-CM

## 2022-08-12 LAB
HCV RNA SERPL NAA+PROBE-ACNC: NORMAL IU/ML
TEST INFORMATION: NORMAL

## 2022-08-12 PROCEDURE — 99213 OFFICE O/P EST LOW 20 MIN: CPT | Performed by: MIDWIFE

## 2022-08-12 RX ORDER — BLOOD-GLUCOSE METER
1 KIT MISCELLANEOUS AS NEEDED
Qty: 1 EACH | Refills: 0 | Status: SHIPPED | OUTPATIENT
Start: 2022-08-12 | End: 2022-08-15 | Stop reason: SDUPTHER

## 2022-08-12 NOTE — PROGRESS NOTES
Chief Complaint   Patient presents with   • Routine Prenatal Visit     No Complaints/concerns        HPI: Linette is a  currently at 29w3d here for prenatal visit who reports the following:  Baby is active. She states she was Hep C+ with first pregnancy and they wanted her to be treated but she couldn't drive to [place]. She denies any ctxs. She states it is difficult to do a 3 hr GTT because of no . She wants to do fingersticks FBS and 1 hour pp.                EXAM:     Vitals:    22 1008   BP: 130/74      Abdomen:   See prenatal flowsheet as noted and reviewed, soft, nontender   Pelvic:  See prenatal flowsheet as noted and reviewed   Urine:  See prenatal flowsheet as noted and reviewed    Lab Results   Component Value Date    ABORH O Positive 2021    ABO O 2022    RH Positive 2022    ABSCRN Negative 2022       MDM:  Impression: Closely spaced pregnancies  Failed Glucola   Tests done today: none   Topics discussed: kick counts and fetal movement  Awaiting Hep C RNA  Glucometer and supplies sent to pharmacy  Reviewed OB labs   Tests next visit: U/S for EFW                RTO:                        3 weeks    This note was electronically signed.  Lizzy Kincaid, RUFUS  2022

## 2022-08-15 RX ORDER — BLOOD-GLUCOSE METER
1 KIT MISCELLANEOUS AS NEEDED
Qty: 1 EACH | Refills: 0 | Status: SHIPPED | OUTPATIENT
Start: 2022-08-15 | End: 2022-08-16 | Stop reason: SDUPTHER

## 2022-08-16 ENCOUNTER — TELEPHONE (OUTPATIENT)
Dept: OBSTETRICS AND GYNECOLOGY | Facility: CLINIC | Age: 32
End: 2022-08-16

## 2022-08-16 RX ORDER — BLOOD-GLUCOSE METER
1 KIT MISCELLANEOUS AS NEEDED
Qty: 1 EACH | Refills: 0 | Status: SHIPPED | OUTPATIENT
Start: 2022-08-16

## 2022-08-16 NOTE — TELEPHONE ENCOUNTER
----- Message from Kobe Gomez sent at 8/16/2022  9:01 AM EDT -----  PATIENT IS STATING [facility] IS REQUESTING A PA FOR HER GLUCOSE MONITOR.

## 2022-09-02 ENCOUNTER — ROUTINE PRENATAL (OUTPATIENT)
Dept: OBSTETRICS AND GYNECOLOGY | Facility: CLINIC | Age: 32
End: 2022-09-02

## 2022-09-02 VITALS — BODY MASS INDEX: 44.89 KG/M2 | SYSTOLIC BLOOD PRESSURE: 128 MMHG | DIASTOLIC BLOOD PRESSURE: 68 MMHG | WEIGHT: 293 LBS

## 2022-09-02 DIAGNOSIS — K21.9 GASTROESOPHAGEAL REFLUX DISEASE WITHOUT ESOPHAGITIS: ICD-10-CM

## 2022-09-02 DIAGNOSIS — O24.410 DIET CONTROLLED GESTATIONAL DIABETES MELLITUS (GDM) IN THIRD TRIMESTER: ICD-10-CM

## 2022-09-02 DIAGNOSIS — O09.93 ENCOUNTER FOR SUPERVISION OF HIGH RISK PREGNANCY IN THIRD TRIMESTER, ANTEPARTUM: Primary | ICD-10-CM

## 2022-09-02 DIAGNOSIS — D50.9 IRON DEFICIENCY ANEMIA DURING PREGNANCY: ICD-10-CM

## 2022-09-02 DIAGNOSIS — O09.899 SHORT INTERVAL BETWEEN PREGNANCIES AFFECTING PREGNANCY, ANTEPARTUM: ICD-10-CM

## 2022-09-02 DIAGNOSIS — O99.019 IRON DEFICIENCY ANEMIA DURING PREGNANCY: ICD-10-CM

## 2022-09-02 PROCEDURE — 99214 OFFICE O/P EST MOD 30 MIN: CPT | Performed by: OBSTETRICS & GYNECOLOGY

## 2022-09-03 NOTE — PROGRESS NOTES
Chief Complaint  Routine Prenatal Visit (Growth scan today, no complaints. )    History of Present Illness:  Linette is a  currently at 32w4d who presents today with no complaints.  Patient has her glucose diary with her.  Her fasting glucose levels have been 93 to maximum of 108.  Her 1 hour levels have been predominantly less than 140 with occasional elevated level to 192.  The patient still has not seen the dietitian.  Patient does report good fetal movement.  She denies any cramping or contractions.  Patient is taking her Pepcid as previously given.  She is also taking her prenatal vitamins.  Scan is obtained for growth as noted.    Exam:  Vitals:  See prenatal flowsheet as noted and reviewed  General: Alert, cooperative, and does not appear in any distress  Abdomen:   See prenatal flowsheet as noted and reviewed    Uterus gravid, non-tender; no palpable masses    No guarding or rebound tenderness  Pelvic:  See prenatal flowsheet as noted and reviewed  Ext:  See prenatal flowsheet as noted and reviewed    Moves extremities well, no cyanosis and no redness  Urine:  See prenatal flowsheet as noted and reviewed    Data Review:  The following data was reviewed by: Trinidad Rangel MD on 2022:  Prenatal Labs:  Lab Results   Component Value Date    HGB 11.6 (L) 08/10/2022    RUBELLAABIGG 0.94 (L) 2022    HEPBSAG Negative 2022    ABORH O Positive 2021    ABO O 2022    RH Positive 2022    ABSCRN Negative 2022    ERM6NPL3 Non Reactive 2022    HEPCVIRUSABY 1.0 (H) 2022       Orders Only on 08/10/2022   Component Date Value   • Hepatitis C Quantitation 08/10/2022 HCV Not Detected    • Test Information 08/10/2022 Comment    Results Encounter on 2022   Component Date Value   • Gestational Diabetes Scr* 08/10/2022 146 (A)   • WBC 08/10/2022 14.36 (A)   • RBC 08/10/2022 3.83    • Hemoglobin 08/10/2022 11.6 (A)   • Hematocrit 08/10/2022 33.9 (A)   • MCV 08/10/2022  88.5    • MCH 08/10/2022 30.3    • MCHC 08/10/2022 34.2    • RDW 08/10/2022 13.0    • Platelets 08/10/2022 338    • Neutrophil Rel % 08/10/2022 77.9 (A)   • Lymphocyte Rel % 08/10/2022 13.6 (A)   • Monocyte Rel % 08/10/2022 5.5    • Eosinophil Rel % 08/10/2022 1.3    • Basophil Rel % 08/10/2022 0.3    • Neutrophils Absolute 08/10/2022 11.19 (A)   • Lymphocytes Absolute 08/10/2022 1.96    • Monocytes Absolute 08/10/2022 0.79    • Eosinophils Absolute 08/10/2022 0.18    • Basophils Absolute 08/10/2022 0.04    • Immature Granulocyte Rel* 08/10/2022 1.4 (A)   • Immature Grans Absolute 08/10/2022 0.20 (A)   • nRBC 08/10/2022 0.0      Imaging:  US Ob 14 + Weeks Single or First Gestation  Impression:   IUP at 27+3 weeks. Anatomy was completely cleared today and all organ   systems were found to be normal in appearance. EFW 1085g(64%) AC 63%.   Transverse presentation. Placenta is posterior. Amniotic fluid and fetal   heart rate are normal.    Doyle Gu MD  Obstetrics and Gynecology  [facility]    Scan today for growth -infant at the 41st percentile.  GRIFFIN 15.61.  Infant in the breech presentation.     Medical Records:  None    Assessment and Plan:  Problem List Items Addressed This Visit        Gravid and     Short interval between pregnancies affecting pregnancy, antepartum      Other Visit Diagnoses     Encounter for supervision of high risk pregnancy in third trimester, antepartum    -  Primary  Topics discussed:     GERD management  glucose management  iron supplementation  kick counts and fetal movement  PIH precautions   labor signs and symptoms      Relevant Orders    US Fetal Biophysical Profile;Without Non-Stress Testing    Diet controlled gestational diabetes mellitus (GDM) in third trimester      I have stressed to the patient the need for aggressive management of her glucose levels.  Patient needs to see dietitian ASAP.  I have discussed with the patient dietary changes.   Patient is to start  testing twice weekly as discussed.  Instructions and precautions have been given.    Relevant Orders    Ambulatory Referral to Nutrition Services    US Fetal Biophysical Profile;Without Non-Stress Testing    Iron deficiency anemia during pregnancy      Is to continue her iron supplements.    Gastroesophageal reflux disease without esophagitis      Patient is to continue her Pepcid as previously given.        Follow Up/Instructions:  Follow up as scheduled.  Patient was given instructions and counseling regarding her condition or for health maintenance advice. Please see specific information pulled into the AVS if appropriate.     Note: Speech recognition transcription software may have been used to dictate portions of this document.  An attempt at proofreading has been made though minor errors in transcription may still be present.    This note was electronically signed.  Trinidad Rangel M.D.

## 2022-09-06 ENCOUNTER — REFERRAL TRIAGE (OUTPATIENT)
Dept: LABOR AND DELIVERY | Facility: HOSPITAL | Age: 32
End: 2022-09-06

## 2022-09-08 ENCOUNTER — HOSPITAL ENCOUNTER (OUTPATIENT)
Facility: HOSPITAL | Age: 32
Discharge: HOME | End: 2022-09-08
Attending: MIDWIFE | Admitting: MIDWIFE

## 2022-09-08 VITALS
HEIGHT: 70 IN | BODY MASS INDEX: 41.95 KG/M2 | TEMPERATURE: 97.5 F | RESPIRATION RATE: 18 BRPM | OXYGEN SATURATION: 100 % | HEART RATE: 102 BPM | WEIGHT: 293 LBS | DIASTOLIC BLOOD PRESSURE: 61 MMHG | SYSTOLIC BLOOD PRESSURE: 125 MMHG

## 2022-09-08 LAB — GLUCOSE BLDC GLUCOMTR-MCNC: 177 MG/DL (ref 70–130)

## 2022-09-08 PROCEDURE — 82962 GLUCOSE BLOOD TEST: CPT

## 2022-09-08 PROCEDURE — G0463 HOSPITAL OUTPT CLINIC VISIT: HCPCS

## 2022-09-08 PROCEDURE — 59025 FETAL NON-STRESS TEST: CPT | Performed by: MIDWIFE

## 2022-09-08 PROCEDURE — 59025 FETAL NON-STRESS TEST: CPT

## 2022-09-08 NOTE — NON STRESS TEST
Triage Note - Nursing Documentation  Labor and Delivery Admission Log    Linette Hanley  : 1990  MRN: [id number]  CSN: [id number]    Date in / Time in:  2022  Time in: 1410    Date out / Time out:  2022  Time out: 1504    Nurse: Luci Neely, RN    Patient Info: She is a 31 y.o. year old  at 33w2d with an UGO of 10/25/2022, by Ultrasound who was seen on the [facility] Labor Sosa.    Chief Complaint:   Chief Complaint   Patient presents with   • Non-stress Test     GDM       Provider Instructions / Disposition: PO hydration given. Pt informed to do the diabetes log book again and check sugars upon waking up and after every meal. DC home.     Patient Active Problem List   Diagnosis   • Severe episode of recurrent major depressive disorder, without psychotic features (HCC)   • Anxiety   • Tobacco abuse   • Short interval between pregnancies affecting pregnancy, antepartum       NST Documentation (Only applicable > 32 weeks): Interpretation A  Nonstress Test Interpretation A: Reactive (22 1500 : Luci Neely, RN)

## 2022-09-09 ENCOUNTER — PATIENT OUTREACH (OUTPATIENT)
Dept: LABOR AND DELIVERY | Facility: HOSPITAL | Age: 32
End: 2022-09-09

## 2022-09-09 NOTE — OUTREACH NOTE
[facility]  Unable to Reach       Questions/Answers    Flowsheet Row Responses   Pending Outreach Confirm Patient Interest   Call Attempt First   Outcome No answer/busy   Next Call Attempt Date 09/13/22   Unable to reach comments: unable to leave vm. Sent mc message            Ant Rojo RN  Maternity Nurse Navigator    9/9/2022, 10:33 EDT

## 2022-09-12 ENCOUNTER — ROUTINE PRENATAL (OUTPATIENT)
Dept: OBSTETRICS AND GYNECOLOGY | Facility: CLINIC | Age: 32
End: 2022-09-12

## 2022-09-12 VITALS — DIASTOLIC BLOOD PRESSURE: 80 MMHG | WEIGHT: 293 LBS | SYSTOLIC BLOOD PRESSURE: 130 MMHG | BODY MASS INDEX: 43.85 KG/M2

## 2022-09-12 DIAGNOSIS — O24.419 GDM, CLASS A2: Primary | ICD-10-CM

## 2022-09-12 PROCEDURE — 99213 OFFICE O/P EST LOW 20 MIN: CPT | Performed by: OBSTETRICS & GYNECOLOGY

## 2022-09-12 RX ORDER — GLYBURIDE 2.5 MG/1
2.5 TABLET ORAL
Qty: 30 TABLET | Refills: 6 | Status: SHIPPED | OUTPATIENT
Start: 2022-09-12 | End: 2022-10-20 | Stop reason: HOSPADM

## 2022-09-12 NOTE — PROGRESS NOTES
Chief Complaint   Patient presents with   • Routine Prenatal Visit     Prenatal visit with BPP done today. No problems or concerns. Does not want to see dietician        HPI:   , 33w6d gestation reports doing well    ROS:  See Prenatal Episode/Flowsheet  /80   Wt (!) 139 kg (305 lb 9.6 oz)   BMI 43.85 kg/m²      EXAM:  EXTREMITIES:  No swelling-See Prenatal Episode/Flowsheet    ABDOMEN:  FHTs/Movement noted-See Prenatal Episode/Flowsheet    URINE GLUCOSE/PROTEIN:  See Prenatal Episode/Flowsheet    PELVIC EXAM:  See Prenatal Episode/Flowsheet  CV:  Lungs:  GYN:    MDM:    Lab Results   Component Value Date    HGB 11.6 (L) 08/10/2022    RUBELLAABIGG 0.94 (L) 2022    HEPBSAG Negative 2022    ABORH O Positive 2021    ABO O 2022    RH Positive 2022    ABSCRN Negative 2022    PCQ4UVX7 Non Reactive 2022    HEPCVIRUSABY 1.0 (H) 2022       U/S:BPP 8/8, GRIFFIN 15.67, Vertex  US Ob Follow Up Transabdominal Approach (2022 13:40)    1. IUP 33w6d  2. Routine care   3. HCV Ab+ RNA negative  4. Abnormal one hour glucola never did 3 hour: fastings ~ 98, one hour breakfasts elevated in the 150's, lunch and dinner good. STart Glyburdie 2.5mg po qam   NST later this week, BPP one week     5. Prioven to 9#6

## 2022-09-15 ENCOUNTER — HOSPITAL ENCOUNTER (OUTPATIENT)
Facility: HOSPITAL | Age: 32
Discharge: HOME | End: 2022-09-15
Attending: MIDWIFE | Admitting: MIDWIFE

## 2022-09-15 VITALS — WEIGHT: 293 LBS | BODY MASS INDEX: 41.95 KG/M2 | TEMPERATURE: 97.4 F | RESPIRATION RATE: 18 BRPM | HEIGHT: 70 IN

## 2022-09-15 LAB
BILIRUB BLD-MCNC: NEGATIVE MG/DL
CLARITY, POC: CLEAR
COLOR UR: NORMAL
GLUCOSE BLDC GLUCOMTR-MCNC: 153 MG/DL (ref 70–130)
GLUCOSE UR STRIP-MCNC: NEGATIVE MG/DL
KETONES UR QL: NEGATIVE
LEUKOCYTE EST, POC: NEGATIVE
NITRITE UR-MCNC: NEGATIVE MG/ML
PH UR: 6 [PH] (ref 5–8)
PROT UR STRIP-MCNC: NEGATIVE MG/DL
RBC # UR STRIP: NEGATIVE /UL
SP GR UR: 1.03 (ref 1–1.03)
UROBILINOGEN UR QL: NORMAL

## 2022-09-15 PROCEDURE — 59025 FETAL NON-STRESS TEST: CPT | Performed by: MIDWIFE

## 2022-09-15 PROCEDURE — 81002 URINALYSIS NONAUTO W/O SCOPE: CPT | Performed by: MIDWIFE

## 2022-09-15 PROCEDURE — G0463 HOSPITAL OUTPT CLINIC VISIT: HCPCS

## 2022-09-15 PROCEDURE — 82962 GLUCOSE BLOOD TEST: CPT

## 2022-09-15 PROCEDURE — 59025 FETAL NON-STRESS TEST: CPT

## 2022-09-15 NOTE — NON STRESS TEST
Triage Note - Nursing Documentation  Labor and Delivery Admission Log    Linette Hanley  : 1990  MRN: [id number]  CSN: [id number]    Date in / Time in:  9/15/2022  Time in: 1608    Date out / Time out:  9/15/2022  Time out: 1711    Nurse: Luci Neely, RN    Patient Info: She is a 31 y.o. year old  at 34w2d with an UGO of 10/25/2022, by Ultrasound who was seen on the [facility] Labor Sosa.    Chief Complaint:   Chief Complaint   Patient presents with   • Non-stress Test     GDM       Provider Instructions / Disposition: PO hydration given. Blood glucose checked. Blood sugar 153. Educated pt on diabetes compliance and diabetic log book. Called [facility] and made sure pt could get her diabetic medication for free since she has [facility]. Informed pt her prescription would be ready in thirty minutes. Discharged home.     Patient Active Problem List   Diagnosis   • Severe episode of recurrent major depressive disorder, without psychotic features (HCC)   • Anxiety   • Tobacco abuse   • Short interval between pregnancies affecting pregnancy, antepartum       NST Documentation (Only applicable > 32 weeks): Interpretation A  Nonstress Test Interpretation A: Reactive (09/15/22 1645 : Luci Neely, RN)

## 2022-09-19 ENCOUNTER — ROUTINE PRENATAL (OUTPATIENT)
Dept: OBSTETRICS AND GYNECOLOGY | Facility: CLINIC | Age: 32
End: 2022-09-19

## 2022-09-19 VITALS — SYSTOLIC BLOOD PRESSURE: 132 MMHG | WEIGHT: 293 LBS | BODY MASS INDEX: 43.19 KG/M2 | DIASTOLIC BLOOD PRESSURE: 80 MMHG

## 2022-09-19 DIAGNOSIS — K21.9 GASTROESOPHAGEAL REFLUX DISEASE WITHOUT ESOPHAGITIS: ICD-10-CM

## 2022-09-19 DIAGNOSIS — O09.93 ENCOUNTER FOR SUPERVISION OF HIGH RISK PREGNANCY IN THIRD TRIMESTER, ANTEPARTUM: Primary | ICD-10-CM

## 2022-09-19 DIAGNOSIS — O09.899 SHORT INTERVAL BETWEEN PREGNANCIES AFFECTING PREGNANCY, ANTEPARTUM: ICD-10-CM

## 2022-09-19 DIAGNOSIS — D50.9 IRON DEFICIENCY ANEMIA DURING PREGNANCY: ICD-10-CM

## 2022-09-19 DIAGNOSIS — O24.419 GDM, CLASS A2: ICD-10-CM

## 2022-09-19 DIAGNOSIS — O99.019 IRON DEFICIENCY ANEMIA DURING PREGNANCY: ICD-10-CM

## 2022-09-19 PROCEDURE — 99214 OFFICE O/P EST MOD 30 MIN: CPT | Performed by: OBSTETRICS & GYNECOLOGY

## 2022-09-20 NOTE — PROGRESS NOTES
Chief Complaint  Routine Prenatal Visit (BPP today, no complaints. )    History of Present Illness:  Linette is a  currently at 35w0d who presents today with no complaints.  Patient does not have her glucose diary with her.  Patient reports she picked up her glyburide this morning.  Patient reports her fasting levels have remained in the 90s.  She reports her highest levels are for breakfast usually over 150.  Patient does report good fetal movement.  She denies any cramping or contractions.  Patient reports she has been taking her prenatal vitamins and iron supplements.  She has continued on her Pepcid as well.    Exam:  Vitals:  See prenatal flowsheet as noted and reviewed  General: Alert, cooperative, and does not appear in any distress  Abdomen:   See prenatal flowsheet as noted and reviewed    Uterus gravid, non-tender; no palpable masses    No guarding or rebound tenderness  Pelvic:  See prenatal flowsheet as noted and reviewed  Ext:  See prenatal flowsheet as noted and reviewed    Moves extremities well, no cyanosis and no redness  Urine:  See prenatal flowsheet as noted and reviewed    Data Review:  The following data was reviewed by: Trinidad Rangel MD on 2022:  Prenatal Labs:  Lab Results   Component Value Date    HGB 11.6 (L) 08/10/2022    RUBELLAABIGG 0.94 (L) 2022    HEPBSAG Negative 2022    ABORH O Positive 2021    ABO O 2022    RH Positive 2022    ABSCRN Negative 2022    [id number] Non Reactive 2022    HEPCVIRUSABY 1.0 (H) 2022       Admission on 09/15/2022, Discharged on 09/15/2022   Component Date Value   • Color 09/15/2022 Dark Yellow    • Clarity, UA 09/15/2022 Clear    • Glucose, UA 09/15/2022 Negative    • Bilirubin 09/15/2022 Negative    • Ketones, UA 09/15/2022 Negative    • Specific Gravity  09/15/2022 1.030    • Blood, UA 09/15/2022 Negative    • pH, Urine 09/15/2022 6.0    • Protein, POC 09/15/2022 Negative    • Urobilinogen, UA  09/15/2022 Normal    • Leukocytes 09/15/2022 Negative    • Nitrite, UA 09/15/2022 Negative    • Glucose 09/15/2022 153 (A)   Admission on 2022, Discharged on 2022   Component Date Value   • Glucose 2022 177 (A)     Imaging:  US Fetal Biophysical Profile;Without Non-Stress Testing  Linette Hanley  : 1990  MRN: [id number]  Date: 2022    Reason for exam/History: Gestational diabetes    Ultrasound images are reviewed.  There is a single viable intrauterine   pregnancy noted. The fetus was in the vertex presentation.  The fetal   heart rate was normal.      The biophysical profile was 8/8:  2 points for fetal breathing movements,   2 points for fetal tone, 2 points for amniotic fluid volume, and 2 points   for fetal movement.  The GRIFFIN was 16.78 cms.    See official report for measurements and structures identified.    Trinidad Rangel MD, [facility]  OB GYN [place]     Medical Records:  None    Assessment and Plan:  Problem List Items Addressed This Visit        Gravid and     Short interval between pregnancies affecting pregnancy, antepartum      Other Visit Diagnoses     Encounter for supervision of high risk pregnancy in third trimester, antepartum    -  Primary  Topics discussed:     GERD management  glucose management  iron supplementation  kick counts and fetal movement  labor signs and symptoms  PIH precautions      GDM, class A2      Recommend patient take her glyburide in the evening as discussed.  Instructions and precautions have been given.  Patient is to bring her glucose diary with her.  I also recommend  testing twice weekly as discussed.    Relevant Orders    US Fetal Biophysical Profile;Without Non-Stress Testing (Completed)    US Ob Follow Up Transabdominal Approach    US Fetal Biophysical Profile;Without Non-Stress Testing    Iron deficiency anemia during pregnancy      Patient is to continue her iron supplements.    Gastroesophageal  reflux disease without esophagitis      Patient is to continue her Pepcid as previously given.        Follow Up/Instructions:  Follow up as scheduled.  Patient was given instructions and counseling regarding her condition or for health maintenance advice. Please see specific information pulled into the AVS if appropriate.     Note: Speech recognition transcription software may have been used to dictate portions of this document.  An attempt at proofreading has been made though minor errors in transcription may still be present.    This note was electronically signed.  Trinidad Rangel M.D.

## 2022-09-22 ENCOUNTER — HOSPITAL ENCOUNTER (OUTPATIENT)
Facility: HOSPITAL | Age: 32
Discharge: HOME | End: 2022-09-22
Attending: MIDWIFE | Admitting: MIDWIFE

## 2022-09-22 ENCOUNTER — HOSPITAL ENCOUNTER (OUTPATIENT)
Dept: LABOR AND DELIVERY | Facility: HOSPITAL | Age: 32
Discharge: HOME | End: 2022-09-22

## 2022-09-22 ENCOUNTER — HOSPITAL ENCOUNTER (OUTPATIENT)
Facility: HOSPITAL | Age: 32
End: 2022-09-22
Attending: MIDWIFE | Admitting: MIDWIFE

## 2022-09-22 VITALS
HEART RATE: 98 BPM | HEIGHT: 68 IN | WEIGHT: 293 LBS | TEMPERATURE: 97.7 F | RESPIRATION RATE: 16 BRPM | SYSTOLIC BLOOD PRESSURE: 144 MMHG | DIASTOLIC BLOOD PRESSURE: 82 MMHG | OXYGEN SATURATION: 98 % | BODY MASS INDEX: 44.41 KG/M2

## 2022-09-22 PROCEDURE — 59025 FETAL NON-STRESS TEST: CPT | Performed by: MIDWIFE

## 2022-09-22 PROCEDURE — G0463 HOSPITAL OUTPT CLINIC VISIT: HCPCS

## 2022-09-22 NOTE — NON STRESS TEST
Triage Note - Nursing Documentation  Labor and Delivery Admission Log    Linette Hanley  : 1990  MRN: [id number]  CSN: [id number]    Date in / Time in:  2022  Time in: 3    Date out / Time out:    Time out: 1500    Nurse: Tamara Bass RN    Patient Info: She is a 31 y.o. year old  at 35w2d with an UGO of 10/25/2022, by Ultrasound who was seen on the [facility] Labor Sosa.    Chief Complaint:   Chief Complaint   Patient presents with   • Non-stress Test     Gestational Diabetes       Provider Instructions / Disposition:     Patient educated on  labor, gestational diabetes, and fetal movement.  Patient verbalized understanding to instructions and signed.  Patient to follow-up in office with next scheduled appointment.  Patient instructed if any change in condition; patient to return to Labor Sosa for assessment/evaluation. Patient discharged allen in stable condition. - Tamara Bass RN.    Patient Active Problem List   Diagnosis   • Severe episode of recurrent major depressive disorder, without psychotic features (HCC)   • Anxiety   • Tobacco abuse   • Short interval between pregnancies affecting pregnancy, antepartum       NST Documentation (Only applicable > 32 weeks):    Reactive

## 2022-09-26 ENCOUNTER — ROUTINE PRENATAL (OUTPATIENT)
Dept: OBSTETRICS AND GYNECOLOGY | Facility: CLINIC | Age: 32
End: 2022-09-26

## 2022-09-26 VITALS — WEIGHT: 293 LBS | SYSTOLIC BLOOD PRESSURE: 122 MMHG | DIASTOLIC BLOOD PRESSURE: 76 MMHG | BODY MASS INDEX: 46.28 KG/M2

## 2022-09-26 DIAGNOSIS — O09.899 SHORT INTERVAL BETWEEN PREGNANCIES AFFECTING PREGNANCY, ANTEPARTUM: ICD-10-CM

## 2022-09-26 DIAGNOSIS — O24.419 GDM, CLASS A2: ICD-10-CM

## 2022-09-26 DIAGNOSIS — O09.93 ENCOUNTER FOR SUPERVISION OF HIGH RISK PREGNANCY IN THIRD TRIMESTER, ANTEPARTUM: Primary | ICD-10-CM

## 2022-09-26 PROCEDURE — 99214 OFFICE O/P EST MOD 30 MIN: CPT | Performed by: STUDENT IN AN ORGANIZED HEALTH CARE EDUCATION/TRAINING PROGRAM

## 2022-09-26 NOTE — PROGRESS NOTES
Prenatal Care Visit    Subjective   Chief Complaint   Patient presents with   • Routine Prenatal Visit       History:   Linette is a  currently at 35w6d who presents for a prenatal care visit today.    Denies CTX, VB, LOF. Reports good FM. Has not been able to take her glucose values consistently, but reports fasting values are usually in 80s, postprandials in 130s.     Objective   /76   Wt (!) 138 kg (304 lb 6.4 oz)   BMI 46.28 kg/m²   Physical Exam:  Normal, gestational age-appropriate exam today        Assessment & Plan     1. IUP @ 35w6d  2. Routine care: I have reviewed the prenatal labs and ultrasound(s) today. I have reviewed the most recent prenatal progress note(s). US notable for AC 94%. EFW 3108g (81%). Cephalic. GBS obtained today.  3. A2GDM: reports good control, encouraged to continue glyburide and to take glucose as often as possible and report back.   4. HCV Ab (+): RNA quant neg  5. Rubella nonimmune: MMR PP     Diagnosis Plan   1. Encounter for supervision of high risk pregnancy in third trimester, antepartum  Group B Streptococcus Culture - Swab, Vaginal/Rectum   2. GDM, class A2     3. Short interval between pregnancies affecting pregnancy, antepartum        Medication Management: continue glyburide 2.5mg QD    Topics discussed: Prenatal care milestones  Glucose management  Pre-eclampsia precautions   labor signs and symptoms  Induction of labor  Postpartum contraception  Shoulder dystocia   Tests next visit: none   Next visit: 1 week(s)     Tanja Ivory MD  Obstetrics and Gynecology  [facility]

## 2022-10-01 LAB — B-HEM STREP SPEC QL CULT: POSITIVE

## 2022-10-03 ENCOUNTER — ROUTINE PRENATAL (OUTPATIENT)
Dept: OBSTETRICS AND GYNECOLOGY | Facility: CLINIC | Age: 32
End: 2022-10-03

## 2022-10-03 VITALS — DIASTOLIC BLOOD PRESSURE: 68 MMHG | SYSTOLIC BLOOD PRESSURE: 120 MMHG | WEIGHT: 293 LBS | BODY MASS INDEX: 46.38 KG/M2

## 2022-10-03 DIAGNOSIS — O24.419 GDM, CLASS A2: Primary | ICD-10-CM

## 2022-10-03 PROCEDURE — 99213 OFFICE O/P EST LOW 20 MIN: CPT | Performed by: OBSTETRICS & GYNECOLOGY

## 2022-10-03 NOTE — PROGRESS NOTES
Chief Complaint   Patient presents with   • Routine Prenatal Visit     Prenatal visit with BPP done today. No problems or concerns.        HPI:   , 36w6d gestation reports doing well    ROS:  See Prenatal Episode/Flowsheet  /68   Wt (!) 138 kg (305 lb)   BMI 46.38 kg/m²      EXAM:  EXTREMITIES:  No swelling-See Prenatal Episode/Flowsheet    ABDOMEN:  FHTs/Movement noted-See Prenatal Episode/Flowsheet    URINE GLUCOSE/PROTEIN:  See Prenatal Episode/Flowsheet    PELVIC EXAM:  See Prenatal Episode/Flowsheet  CV:  Lungs:  GYN:    MDM:    Lab Results   Component Value Date    HGB 11.6 (L) 08/10/2022    RUBELLAABIGG 0.94 (L) 2022    HEPBSAG Negative 2022    ABORH O Positive 2021    ABO O 2022    RH Positive 2022    ABSCRN Negative 2022    [id number] Non Reactive 2022    HEPCVIRUSABY 1.0 (H) 2022    STREPGPB Positive (A) 2022       U/S: BPP 8/8, GRIFFIN 16.2, Vertex    US Ob Follow Up Transabdominal Approach (2022 14:06)    1. IUP 36w6d  2. Routine care   3. LGA: proven to 9#6  4. A2 GDM: glyburide 2.5mg po qam-- states FSBS WNL   NST Thursday, BPP one week

## 2022-10-04 ENCOUNTER — PATIENT OUTREACH (OUTPATIENT)
Dept: LABOR AND DELIVERY | Facility: HOSPITAL | Age: 32
End: 2022-10-04

## 2022-10-04 NOTE — OUTREACH NOTE
[facility]  Unable to Reach       Questions/Answers    Flowsheet Row Responses   Pending Outreach Confirm Patient Interest   Call Attempt Second   Outcome No answer/busy   Unable to reach comments: Unable to leave VM.          Closed.     Ant Rojo RN  Maternity Nurse Navigator    10/4/2022, 13:45 EDT

## 2022-10-10 ENCOUNTER — PREP FOR SURGERY (OUTPATIENT)
Dept: OTHER | Facility: HOSPITAL | Age: 32
End: 2022-10-10

## 2022-10-10 ENCOUNTER — ROUTINE PRENATAL (OUTPATIENT)
Dept: OBSTETRICS AND GYNECOLOGY | Facility: CLINIC | Age: 32
End: 2022-10-10

## 2022-10-10 VITALS — BODY MASS INDEX: 46.53 KG/M2 | SYSTOLIC BLOOD PRESSURE: 124 MMHG | WEIGHT: 293 LBS | DIASTOLIC BLOOD PRESSURE: 74 MMHG

## 2022-10-10 DIAGNOSIS — O09.899 SHORT INTERVAL BETWEEN PREGNANCIES AFFECTING PREGNANCY, ANTEPARTUM: ICD-10-CM

## 2022-10-10 DIAGNOSIS — O09.93 ENCOUNTER FOR SUPERVISION OF HIGH RISK PREGNANCY IN THIRD TRIMESTER, ANTEPARTUM: Primary | ICD-10-CM

## 2022-10-10 DIAGNOSIS — Z34.90 ENCOUNTER FOR PLANNED INDUCTION OF LABOR: Primary | ICD-10-CM

## 2022-10-10 DIAGNOSIS — O99.820 GBS (GROUP B STREPTOCOCCUS CARRIER), +RV CULTURE, CURRENTLY PREGNANT: ICD-10-CM

## 2022-10-10 DIAGNOSIS — O24.419 GDM, CLASS A2: ICD-10-CM

## 2022-10-10 PROCEDURE — 0502F SUBSEQUENT PRENATAL CARE: CPT | Performed by: STUDENT IN AN ORGANIZED HEALTH CARE EDUCATION/TRAINING PROGRAM

## 2022-10-10 RX ORDER — MORPHINE SULFATE 2 MG/ML
2 INJECTION, SOLUTION INTRAMUSCULAR; INTRAVENOUS
Status: CANCELLED | OUTPATIENT
Start: 2022-10-10 | End: 2022-10-20

## 2022-10-10 RX ORDER — PROMETHAZINE HYDROCHLORIDE 12.5 MG/1
12.5 SUPPOSITORY RECTAL EVERY 6 HOURS PRN
Status: CANCELLED | OUTPATIENT
Start: 2022-10-10

## 2022-10-10 RX ORDER — SODIUM CHLORIDE 0.9 % (FLUSH) 0.9 %
3-10 SYRINGE (ML) INJECTION AS NEEDED
Status: CANCELLED | OUTPATIENT
Start: 2022-10-10

## 2022-10-10 RX ORDER — OXYTOCIN/0.9 % SODIUM CHLORIDE 30/500 ML
999 PLASTIC BAG, INJECTION (ML) INTRAVENOUS ONCE
Status: CANCELLED | OUTPATIENT
Start: 2022-10-10 | End: 2022-10-10

## 2022-10-10 RX ORDER — TERBUTALINE SULFATE 1 MG/ML
0.25 INJECTION, SOLUTION SUBCUTANEOUS AS NEEDED
Status: CANCELLED | OUTPATIENT
Start: 2022-10-10

## 2022-10-10 RX ORDER — MAGNESIUM CARB/ALUMINUM HYDROX 105-160MG
30 TABLET,CHEWABLE ORAL ONCE
Status: CANCELLED | OUTPATIENT
Start: 2022-10-10 | End: 2022-10-10

## 2022-10-10 RX ORDER — PROMETHAZINE HYDROCHLORIDE 12.5 MG/1
12.5 TABLET ORAL EVERY 6 HOURS PRN
Status: CANCELLED | OUTPATIENT
Start: 2022-10-10

## 2022-10-10 RX ORDER — LIDOCAINE HYDROCHLORIDE 10 MG/ML
5 INJECTION, SOLUTION EPIDURAL; INFILTRATION; INTRACAUDAL; PERINEURAL AS NEEDED
Status: CANCELLED | OUTPATIENT
Start: 2022-10-10

## 2022-10-10 RX ORDER — FAMOTIDINE 20 MG/1
20 TABLET, FILM COATED ORAL ONCE AS NEEDED
Status: CANCELLED | OUTPATIENT
Start: 2022-10-10

## 2022-10-10 RX ORDER — FAMOTIDINE 20 MG/1
20 TABLET, FILM COATED ORAL EVERY 12 HOURS SCHEDULED
Status: CANCELLED | OUTPATIENT
Start: 2022-10-10

## 2022-10-10 RX ORDER — ONDANSETRON 2 MG/ML
4 INJECTION INTRAMUSCULAR; INTRAVENOUS EVERY 6 HOURS PRN
Status: CANCELLED | OUTPATIENT
Start: 2022-10-10

## 2022-10-10 RX ORDER — DIPHENHYDRAMINE HYDROCHLORIDE 50 MG/ML
25 INJECTION INTRAMUSCULAR; INTRAVENOUS NIGHTLY PRN
Status: CANCELLED | OUTPATIENT
Start: 2022-10-10

## 2022-10-10 RX ORDER — MISOPROSTOL 200 UG/1
800 TABLET ORAL AS NEEDED
Status: CANCELLED | OUTPATIENT
Start: 2022-10-10

## 2022-10-10 RX ORDER — TRISODIUM CITRATE DIHYDRATE AND CITRIC ACID MONOHYDRATE 500; 334 MG/5ML; MG/5ML
30 SOLUTION ORAL ONCE AS NEEDED
Status: CANCELLED | OUTPATIENT
Start: 2022-10-10

## 2022-10-10 RX ORDER — FAMOTIDINE 10 MG/ML
20 INJECTION, SOLUTION INTRAVENOUS EVERY 12 HOURS SCHEDULED
Status: CANCELLED | OUTPATIENT
Start: 2022-10-10

## 2022-10-10 RX ORDER — IBUPROFEN 600 MG/1
600 TABLET ORAL EVERY 6 HOURS PRN
Status: CANCELLED | OUTPATIENT
Start: 2022-10-10

## 2022-10-10 RX ORDER — FAMOTIDINE 10 MG/ML
20 INJECTION, SOLUTION INTRAVENOUS ONCE AS NEEDED
Status: CANCELLED | OUTPATIENT
Start: 2022-10-10

## 2022-10-10 RX ORDER — SODIUM CHLORIDE 0.9 % (FLUSH) 0.9 %
3 SYRINGE (ML) INJECTION EVERY 12 HOURS SCHEDULED
Status: CANCELLED | OUTPATIENT
Start: 2022-10-10

## 2022-10-10 RX ORDER — DIPHENHYDRAMINE HCL 25 MG
25 CAPSULE ORAL NIGHTLY PRN
Status: CANCELLED | OUTPATIENT
Start: 2022-10-10

## 2022-10-10 RX ORDER — OXYTOCIN/0.9 % SODIUM CHLORIDE 30/500 ML
250 PLASTIC BAG, INJECTION (ML) INTRAVENOUS CONTINUOUS
Status: CANCELLED | OUTPATIENT
Start: 2022-10-10 | End: 2022-10-10

## 2022-10-10 RX ORDER — METHYLERGONOVINE MALEATE 0.2 MG/ML
200 INJECTION INTRAVENOUS ONCE AS NEEDED
Status: CANCELLED | OUTPATIENT
Start: 2022-10-10

## 2022-10-10 RX ORDER — ACETAMINOPHEN 325 MG/1
650 TABLET ORAL EVERY 4 HOURS PRN
Status: CANCELLED | OUTPATIENT
Start: 2022-10-10

## 2022-10-10 RX ORDER — CARBOPROST TROMETHAMINE 250 UG/ML
250 INJECTION, SOLUTION INTRAMUSCULAR AS NEEDED
Status: CANCELLED | OUTPATIENT
Start: 2022-10-10

## 2022-10-10 RX ORDER — PENICILLIN G 3000000 [IU]/50ML
3 INJECTION, SOLUTION INTRAVENOUS
Status: CANCELLED | OUTPATIENT
Start: 2022-10-10 | End: 2022-10-10

## 2022-10-10 RX ORDER — PENICILLIN G 3000000 [IU]/50ML
3 INJECTION, SOLUTION INTRAVENOUS
Status: CANCELLED | OUTPATIENT
Start: 2022-10-10 | End: 2022-10-13

## 2022-10-10 RX ORDER — OXYTOCIN/0.9 % SODIUM CHLORIDE 30/500 ML
2-30 PLASTIC BAG, INJECTION (ML) INTRAVENOUS
Status: CANCELLED | OUTPATIENT
Start: 2022-10-10

## 2022-10-10 RX ORDER — SODIUM CHLORIDE, SODIUM LACTATE, POTASSIUM CHLORIDE, CALCIUM CHLORIDE 600; 310; 30; 20 MG/100ML; MG/100ML; MG/100ML; MG/100ML
125 INJECTION, SOLUTION INTRAVENOUS CONTINUOUS
Status: CANCELLED | OUTPATIENT
Start: 2022-10-10

## 2022-10-10 RX ORDER — ONDANSETRON 4 MG/1
4 TABLET, FILM COATED ORAL EVERY 6 HOURS PRN
Status: CANCELLED | OUTPATIENT
Start: 2022-10-10

## 2022-10-10 NOTE — PROGRESS NOTES
Prenatal Care Visit    Subjective   Chief Complaint   Patient presents with   • Routine Prenatal Visit     BPP done today, no complaints.       History:   Linette is a  currently at 37w6d who presents for a prenatal care visit today.    Denies CTX, VB, LOF. Reports (+) FM.  Fasting glucose: < 100  Postprandial: 120s     Objective   /74   Wt (!) 139 kg (306 lb)   BMI 46.53 kg/m²   Physical Exam:  Normal, gestational age-appropriate exam today   CVX: 3/40/-3, soft, midposition       Assessment & Plan     1. IUP @ 37w6d  2. Routine care: I have reviewed the prenatal labs and ultrasound(s) today. I have reviewed the most recent prenatal progress note(s). BPP  today. IOL scheduled for 10/18/22 at 39w0d.  3. A2GDM: well controlled on glyburide 2.5mg QD  4. LGA: AC 95% at 35 wks, proven to 4366g  5. GBS (+): PCN intrapartum  6. HCV Ab (+): RNA quant neg  7. Rubella nonimmune: MMR PP     Diagnosis Plan   1. Encounter for supervision of high risk pregnancy in third trimester, antepartum        2. Short interval between pregnancies affecting pregnancy, antepartum        3. GDM, class A2  US Fetal Biophysical Profile;Without Non-Stress Testing      4. GBS (group B Streptococcus carrier), +RV culture, currently pregnant           Medication Management: continue glyburide 2.5mg    Topics discussed: Prenatal care milestones  Glucose management  Kick counts and fetal movement  Labor signs and symptoms   section risks, benefits  Induction of labor - risks, benefits and alternatives reviewed and consents signed. Prep for case placed for 10/18/22 at 39w0d.   Tests next visit: BPP   Next visit: 1 week(s)     Tanja Ivory MD  Obstetrics and Gynecology  [facility]

## 2022-10-14 ENCOUNTER — HOSPITAL ENCOUNTER (OUTPATIENT)
Facility: HOSPITAL | Age: 32
Discharge: HOME | End: 2022-10-14
Attending: MIDWIFE | Admitting: MIDWIFE

## 2022-10-14 ENCOUNTER — TELEPHONE (OUTPATIENT)
Dept: OBSTETRICS AND GYNECOLOGY | Facility: CLINIC | Age: 32
End: 2022-10-14

## 2022-10-14 VITALS
WEIGHT: 293 LBS | BODY MASS INDEX: 46.98 KG/M2 | SYSTOLIC BLOOD PRESSURE: 126 MMHG | HEART RATE: 106 BPM | DIASTOLIC BLOOD PRESSURE: 70 MMHG | TEMPERATURE: 97.7 F

## 2022-10-14 LAB
A1 MICROGLOB PLACENTAL VAG QL: NEGATIVE
BILIRUB BLD-MCNC: NEGATIVE MG/DL
CLARITY, POC: ABNORMAL
COLOR UR: ABNORMAL
GLUCOSE UR STRIP-MCNC: NEGATIVE MG/DL
KETONES UR QL: NEGATIVE
LEUKOCYTE EST, POC: ABNORMAL
NITRITE UR-MCNC: NEGATIVE MG/ML
PH UR: 6 [PH] (ref 5–8)
PROT UR STRIP-MCNC: ABNORMAL MG/DL
RBC # UR STRIP: ABNORMAL /UL
SP GR UR: 1.02 (ref 1–1.03)
UROBILINOGEN UR QL: NORMAL

## 2022-10-14 PROCEDURE — 81002 URINALYSIS NONAUTO W/O SCOPE: CPT | Performed by: MIDWIFE

## 2022-10-14 PROCEDURE — 59025 FETAL NON-STRESS TEST: CPT

## 2022-10-14 PROCEDURE — 59025 FETAL NON-STRESS TEST: CPT | Performed by: MIDWIFE

## 2022-10-14 PROCEDURE — 84112 EVAL AMNIOTIC FLUID PROTEIN: CPT | Performed by: MIDWIFE

## 2022-10-14 PROCEDURE — G0463 HOSPITAL OUTPT CLINIC VISIT: HCPCS

## 2022-10-14 NOTE — TELEPHONE ENCOUNTER
Patient called and stated that she was dilated to a 3. Patient states she is feeling more contractions and feels that she has progressed further. I advised patient to go to L&D.

## 2022-10-14 NOTE — NURSING NOTE
RUFUS Stinson present on .  Discussed case regarding c/o lower back pain with lower abdominal pain, unsure if leaking fluid. Pt desires cervix to be checked.  Pt amnisure was negative, VE 4/50-60/-3.  Contractions irregular.  Pt standing at bedside wanting to leave because son is getting tired.  APRN states that she may be discharged home.  Pt educated when to return to  and provided with THI for early labor signs and masood cates ctx.

## 2022-10-14 NOTE — NON STRESS TEST
"Triage Note - Nursing Documentation  Labor and Delivery Admission Log    Linette Hanley  : 1990  MRN: [id number]  CSN: [id number]    Date in / Time in:  10/14/2022  Time in:     Date out / Time out:    Time out: 1525    Nurse: Jenn Tilley RN    Patient Info: She is a 31 y.o. year old  at 38w3d with an UGO of 10/25/2022, by Ultrasound who was seen on the [facility] Labor Sosa.    Chief Complaint:   Chief Complaint   Patient presents with   • Back Pain     \"I FEEL A LOT OF PRESSURE IN MY LOWER BACK\"       Provider Instructions / Disposition: Discharged home per pt request.     Patient Active Problem List   Diagnosis   • Severe episode of recurrent major depressive disorder, without psychotic features (HCC)   • Anxiety   • Tobacco abuse   • Short interval between pregnancies affecting pregnancy, antepartum   • GDM, class A2       NST Documentation (Only applicable > 32 weeks): Interpretation A  Nonstress Test Interpretation A: Reactive (10/14/22 1534 : Jenn Tilley, RN)    "

## 2022-10-17 ENCOUNTER — TELEPHONE (OUTPATIENT)
Dept: OBSTETRICS AND GYNECOLOGY | Facility: CLINIC | Age: 32
End: 2022-10-17

## 2022-10-17 NOTE — TELEPHONE ENCOUNTER
Provider: DR KENNEDY  Caller: JESSE NEVAREZ  Relationship to Patient: SELF  Reason for Call: PT CALLED TO CONFIRM IF APPTS SCHEDULED 10/17/22 WERE NEEDED, SHE IS BEING INDUCED 10/18/22.    CONFIRMED WITH OFFICE/DR ONEIL TO CANCEL.

## 2022-10-18 ENCOUNTER — HOSPITAL ENCOUNTER (INPATIENT)
Facility: HOSPITAL | Age: 32
LOS: 2 days | Discharge: HOME | End: 2022-10-20
Attending: MIDWIFE | Admitting: MIDWIFE

## 2022-10-18 ENCOUNTER — HOSPITAL ENCOUNTER (OUTPATIENT)
Dept: LABOR AND DELIVERY | Facility: HOSPITAL | Age: 32
Discharge: HOME | End: 2022-10-18

## 2022-10-18 DIAGNOSIS — Z34.90 ENCOUNTER FOR PLANNED INDUCTION OF LABOR: ICD-10-CM

## 2022-10-18 LAB
ABO GROUP BLD: NORMAL
ABO GROUP BLD: NORMAL
BILIRUB BLD-MCNC: NEGATIVE MG/DL
BLD GP AB SCN SERPL QL: NEGATIVE
CLARITY, POC: CLEAR
COLOR UR: ABNORMAL
DEPRECATED RDW RBC AUTO: 45 FL (ref 37–54)
ERYTHROCYTE [DISTWIDTH] IN BLOOD BY AUTOMATED COUNT: 14.1 % (ref 12.3–15.4)
GLUCOSE UR STRIP-MCNC: NEGATIVE MG/DL
HCT VFR BLD AUTO: 36 % (ref 34–46.6)
HGB BLD-MCNC: 12.1 G/DL (ref 12–15.9)
KETONES UR QL: ABNORMAL
LEUKOCYTE EST, POC: NEGATIVE
MCH RBC QN AUTO: 29.5 PG (ref 26.6–33)
MCHC RBC AUTO-ENTMCNC: 33.6 G/DL (ref 31.5–35.7)
MCV RBC AUTO: 87.8 FL (ref 79–97)
NITRITE UR-MCNC: NEGATIVE MG/ML
PH UR: 5 [PH] (ref 5–8)
PLATELET # BLD AUTO: 392 10*3/MM3 (ref 140–450)
PMV BLD AUTO: 10.2 FL (ref 6–12)
PROT UR STRIP-MCNC: NEGATIVE MG/DL
RBC # BLD AUTO: 4.1 10*6/MM3 (ref 3.77–5.28)
RBC # UR STRIP: ABNORMAL /UL
RH BLD: POSITIVE
RH BLD: POSITIVE
SARS-COV-2 RNA PNL SPEC NAA+PROBE: NOT DETECTED
SP GR UR: 1.01 (ref 1–1.03)
T&S EXPIRATION DATE: NORMAL
UROBILINOGEN UR QL: NORMAL
WBC NRBC COR # BLD: 14.21 10*3/MM3 (ref 3.4–10.8)

## 2022-10-18 PROCEDURE — 87635 SARS-COV-2 COVID-19 AMP PRB: CPT | Performed by: MIDWIFE

## 2022-10-18 PROCEDURE — 85027 COMPLETE CBC AUTOMATED: CPT | Performed by: STUDENT IN AN ORGANIZED HEALTH CARE EDUCATION/TRAINING PROGRAM

## 2022-10-18 PROCEDURE — 86850 RBC ANTIBODY SCREEN: CPT | Performed by: STUDENT IN AN ORGANIZED HEALTH CARE EDUCATION/TRAINING PROGRAM

## 2022-10-18 PROCEDURE — 86901 BLOOD TYPING SEROLOGIC RH(D): CPT

## 2022-10-18 PROCEDURE — 86900 BLOOD TYPING SEROLOGIC ABO: CPT | Performed by: STUDENT IN AN ORGANIZED HEALTH CARE EDUCATION/TRAINING PROGRAM

## 2022-10-18 PROCEDURE — 81002 URINALYSIS NONAUTO W/O SCOPE: CPT | Performed by: STUDENT IN AN ORGANIZED HEALTH CARE EDUCATION/TRAINING PROGRAM

## 2022-10-18 PROCEDURE — 86900 BLOOD TYPING SEROLOGIC ABO: CPT

## 2022-10-18 PROCEDURE — 86901 BLOOD TYPING SEROLOGIC RH(D): CPT | Performed by: STUDENT IN AN ORGANIZED HEALTH CARE EDUCATION/TRAINING PROGRAM

## 2022-10-18 RX ORDER — METHYLERGONOVINE MALEATE 0.2 MG/ML
200 INJECTION INTRAVENOUS ONCE AS NEEDED
Status: COMPLETED | OUTPATIENT
Start: 2022-10-18 | End: 2022-10-19

## 2022-10-18 RX ORDER — OXYTOCIN/0.9 % SODIUM CHLORIDE 30/500 ML
2-30 PLASTIC BAG, INJECTION (ML) INTRAVENOUS
Status: DISCONTINUED | OUTPATIENT
Start: 2022-10-18 | End: 2022-10-19 | Stop reason: HOSPADM

## 2022-10-18 RX ORDER — PROMETHAZINE HYDROCHLORIDE 12.5 MG/1
12.5 TABLET ORAL EVERY 6 HOURS PRN
Status: DISCONTINUED | OUTPATIENT
Start: 2022-10-18 | End: 2022-10-19 | Stop reason: HOSPADM

## 2022-10-18 RX ORDER — ONDANSETRON 2 MG/ML
4 INJECTION INTRAMUSCULAR; INTRAVENOUS EVERY 6 HOURS PRN
Status: DISCONTINUED | OUTPATIENT
Start: 2022-10-18 | End: 2022-10-19 | Stop reason: HOSPADM

## 2022-10-18 RX ORDER — FAMOTIDINE 10 MG/ML
20 INJECTION, SOLUTION INTRAVENOUS ONCE AS NEEDED
Status: DISCONTINUED | OUTPATIENT
Start: 2022-10-18 | End: 2022-10-19 | Stop reason: HOSPADM

## 2022-10-18 RX ORDER — TERBUTALINE SULFATE 1 MG/ML
0.25 INJECTION, SOLUTION SUBCUTANEOUS AS NEEDED
Status: DISCONTINUED | OUTPATIENT
Start: 2022-10-18 | End: 2022-10-19 | Stop reason: HOSPADM

## 2022-10-18 RX ORDER — IBUPROFEN 600 MG/1
600 TABLET ORAL EVERY 6 HOURS PRN
Status: DISCONTINUED | OUTPATIENT
Start: 2022-10-18 | End: 2022-10-19 | Stop reason: HOSPADM

## 2022-10-18 RX ORDER — SODIUM CHLORIDE 0.9 % (FLUSH) 0.9 %
3-10 SYRINGE (ML) INJECTION AS NEEDED
Status: DISCONTINUED | OUTPATIENT
Start: 2022-10-18 | End: 2022-10-19 | Stop reason: HOSPADM

## 2022-10-18 RX ORDER — MORPHINE SULFATE 2 MG/ML
6 INJECTION, SOLUTION INTRAMUSCULAR; INTRAVENOUS
Status: DISCONTINUED | OUTPATIENT
Start: 2022-10-18 | End: 2022-10-19 | Stop reason: HOSPADM

## 2022-10-18 RX ORDER — TRISODIUM CITRATE DIHYDRATE AND CITRIC ACID MONOHYDRATE 500; 334 MG/5ML; MG/5ML
30 SOLUTION ORAL ONCE AS NEEDED
Status: DISCONTINUED | OUTPATIENT
Start: 2022-10-18 | End: 2022-10-19 | Stop reason: HOSPADM

## 2022-10-18 RX ORDER — PENICILLIN G 3000000 [IU]/50ML
3 INJECTION, SOLUTION INTRAVENOUS
Status: DISCONTINUED | OUTPATIENT
Start: 2022-10-19 | End: 2022-10-19 | Stop reason: HOSPADM

## 2022-10-18 RX ORDER — FAMOTIDINE 20 MG/1
20 TABLET, FILM COATED ORAL ONCE AS NEEDED
Status: DISCONTINUED | OUTPATIENT
Start: 2022-10-18 | End: 2022-10-19 | Stop reason: HOSPADM

## 2022-10-18 RX ORDER — MORPHINE SULFATE 2 MG/ML
2 INJECTION, SOLUTION INTRAMUSCULAR; INTRAVENOUS
Status: DISCONTINUED | OUTPATIENT
Start: 2022-10-18 | End: 2022-10-19 | Stop reason: HOSPADM

## 2022-10-18 RX ORDER — PENICILLIN G 3000000 [IU]/50ML
3 INJECTION, SOLUTION INTRAVENOUS
Status: DISPENSED | OUTPATIENT
Start: 2022-10-18 | End: 2022-10-18

## 2022-10-18 RX ORDER — FAMOTIDINE 20 MG/1
20 TABLET, FILM COATED ORAL EVERY 12 HOURS SCHEDULED
Status: DISCONTINUED | OUTPATIENT
Start: 2022-10-18 | End: 2022-10-19 | Stop reason: HOSPADM

## 2022-10-18 RX ORDER — SODIUM CHLORIDE 0.9 % (FLUSH) 0.9 %
3 SYRINGE (ML) INJECTION EVERY 12 HOURS SCHEDULED
Status: DISCONTINUED | OUTPATIENT
Start: 2022-10-18 | End: 2022-10-19 | Stop reason: HOSPADM

## 2022-10-18 RX ORDER — CARBOPROST TROMETHAMINE 250 UG/ML
250 INJECTION, SOLUTION INTRAMUSCULAR AS NEEDED
Status: DISCONTINUED | OUTPATIENT
Start: 2022-10-18 | End: 2022-10-19 | Stop reason: HOSPADM

## 2022-10-18 RX ORDER — LIDOCAINE HYDROCHLORIDE 10 MG/ML
5 INJECTION, SOLUTION EPIDURAL; INFILTRATION; INTRACAUDAL; PERINEURAL AS NEEDED
Status: DISCONTINUED | OUTPATIENT
Start: 2022-10-18 | End: 2022-10-19 | Stop reason: HOSPADM

## 2022-10-18 RX ORDER — OXYTOCIN/0.9 % SODIUM CHLORIDE 30/500 ML
999 PLASTIC BAG, INJECTION (ML) INTRAVENOUS ONCE
Status: DISCONTINUED | OUTPATIENT
Start: 2022-10-18 | End: 2022-10-19 | Stop reason: HOSPADM

## 2022-10-18 RX ORDER — FAMOTIDINE 10 MG/ML
20 INJECTION, SOLUTION INTRAVENOUS EVERY 12 HOURS SCHEDULED
Status: DISCONTINUED | OUTPATIENT
Start: 2022-10-18 | End: 2022-10-19 | Stop reason: HOSPADM

## 2022-10-18 RX ORDER — ONDANSETRON 4 MG/1
4 TABLET, FILM COATED ORAL EVERY 6 HOURS PRN
Status: DISCONTINUED | OUTPATIENT
Start: 2022-10-18 | End: 2022-10-19 | Stop reason: HOSPADM

## 2022-10-18 RX ORDER — MORPHINE SULFATE 4 MG/ML
4 INJECTION, SOLUTION INTRAMUSCULAR; INTRAVENOUS
Status: DISCONTINUED | OUTPATIENT
Start: 2022-10-18 | End: 2022-10-19 | Stop reason: HOSPADM

## 2022-10-18 RX ORDER — PROMETHAZINE HYDROCHLORIDE 12.5 MG/1
12.5 SUPPOSITORY RECTAL EVERY 6 HOURS PRN
Status: DISCONTINUED | OUTPATIENT
Start: 2022-10-18 | End: 2022-10-19 | Stop reason: HOSPADM

## 2022-10-18 RX ORDER — DIPHENHYDRAMINE HCL 25 MG
25 CAPSULE ORAL NIGHTLY PRN
Status: DISCONTINUED | OUTPATIENT
Start: 2022-10-18 | End: 2022-10-19 | Stop reason: HOSPADM

## 2022-10-18 RX ORDER — MAGNESIUM CARB/ALUMINUM HYDROX 105-160MG
30 TABLET,CHEWABLE ORAL ONCE
Status: DISCONTINUED | OUTPATIENT
Start: 2022-10-18 | End: 2022-10-19 | Stop reason: HOSPADM

## 2022-10-18 RX ORDER — DIPHENHYDRAMINE HYDROCHLORIDE 50 MG/ML
25 INJECTION INTRAMUSCULAR; INTRAVENOUS NIGHTLY PRN
Status: DISCONTINUED | OUTPATIENT
Start: 2022-10-18 | End: 2022-10-19 | Stop reason: HOSPADM

## 2022-10-18 RX ORDER — SODIUM CHLORIDE, SODIUM LACTATE, POTASSIUM CHLORIDE, CALCIUM CHLORIDE 600; 310; 30; 20 MG/100ML; MG/100ML; MG/100ML; MG/100ML
125 INJECTION, SOLUTION INTRAVENOUS CONTINUOUS
Status: DISCONTINUED | OUTPATIENT
Start: 2022-10-18 | End: 2022-10-19

## 2022-10-18 RX ORDER — ACETAMINOPHEN 325 MG/1
650 TABLET ORAL EVERY 4 HOURS PRN
Status: DISCONTINUED | OUTPATIENT
Start: 2022-10-18 | End: 2022-10-19 | Stop reason: HOSPADM

## 2022-10-18 RX ORDER — OXYTOCIN/0.9 % SODIUM CHLORIDE 30/500 ML
250 PLASTIC BAG, INJECTION (ML) INTRAVENOUS CONTINUOUS
Status: DISPENSED | OUTPATIENT
Start: 2022-10-18 | End: 2022-10-18

## 2022-10-18 RX ORDER — MISOPROSTOL 200 UG/1
800 TABLET ORAL AS NEEDED
Status: DISCONTINUED | OUTPATIENT
Start: 2022-10-18 | End: 2022-10-19 | Stop reason: HOSPADM

## 2022-10-18 RX ADMIN — PENICILLIN G 3 MILLION UNITS: 3000000 INJECTION, SOLUTION INTRAVENOUS at 21:25

## 2022-10-18 RX ADMIN — PENICILLIN G 3 MILLION UNITS: 3000000 INJECTION, SOLUTION INTRAVENOUS at 22:19

## 2022-10-18 RX ADMIN — SODIUM CHLORIDE, POTASSIUM CHLORIDE, SODIUM LACTATE AND CALCIUM CHLORIDE 125 ML/HR: 600; 310; 30; 20 INJECTION, SOLUTION INTRAVENOUS at 21:20

## 2022-10-19 ENCOUNTER — ANESTHESIA EVENT (OUTPATIENT)
Dept: LABOR AND DELIVERY | Facility: HOSPITAL | Age: 32
End: 2022-10-19

## 2022-10-19 ENCOUNTER — ANESTHESIA (OUTPATIENT)
Dept: LABOR AND DELIVERY | Facility: HOSPITAL | Age: 32
End: 2022-10-19

## 2022-10-19 LAB — GLUCOSE BLDC GLUCOMTR-MCNC: 101 MG/DL (ref 70–130)

## 2022-10-19 PROCEDURE — 51703 INSERT BLADDER CATH COMPLEX: CPT

## 2022-10-19 PROCEDURE — 25010000002 METHYLERGONOVINE MALEATE PER 0.2 MG: Performed by: STUDENT IN AN ORGANIZED HEALTH CARE EDUCATION/TRAINING PROGRAM

## 2022-10-19 PROCEDURE — 25010000002 PENICILLIN G POTASSIUM PER 600000 UNITS: Performed by: STUDENT IN AN ORGANIZED HEALTH CARE EDUCATION/TRAINING PROGRAM

## 2022-10-19 PROCEDURE — 10907ZC DRAINAGE OF AMNIOTIC FLUID, THERAPEUTIC FROM PRODUCTS OF CONCEPTION, VIA NATURAL OR ARTIFICIAL OPENING: ICD-10-PCS | Performed by: OBSTETRICS & GYNECOLOGY

## 2022-10-19 PROCEDURE — 25010000002 ONDANSETRON PER 1 MG: Performed by: NURSE ANESTHETIST, CERTIFIED REGISTERED

## 2022-10-19 PROCEDURE — 82962 GLUCOSE BLOOD TEST: CPT

## 2022-10-19 PROCEDURE — 3E033VJ INTRODUCTION OF OTHER HORMONE INTO PERIPHERAL VEIN, PERCUTANEOUS APPROACH: ICD-10-PCS | Performed by: OBSTETRICS & GYNECOLOGY

## 2022-10-19 PROCEDURE — 25010000002 FENTANYL CITRATE (PF) 100 MCG/2ML SOLUTION: Performed by: NURSE ANESTHETIST, CERTIFIED REGISTERED

## 2022-10-19 PROCEDURE — 59410 OBSTETRICAL CARE: CPT | Performed by: OBSTETRICS & GYNECOLOGY

## 2022-10-19 RX ORDER — PROMETHAZINE HYDROCHLORIDE 12.5 MG/1
12.5 SUPPOSITORY RECTAL EVERY 6 HOURS PRN
Status: DISCONTINUED | OUTPATIENT
Start: 2022-10-19 | End: 2022-10-20 | Stop reason: HOSPADM

## 2022-10-19 RX ORDER — DIPHENHYDRAMINE HCL 25 MG
25 CAPSULE ORAL NIGHTLY PRN
Status: DISCONTINUED | OUTPATIENT
Start: 2022-10-19 | End: 2022-10-20 | Stop reason: HOSPADM

## 2022-10-19 RX ORDER — DOCUSATE SODIUM 100 MG/1
100 CAPSULE, LIQUID FILLED ORAL 2 TIMES DAILY PRN
Status: DISCONTINUED | OUTPATIENT
Start: 2022-10-19 | End: 2022-10-20 | Stop reason: HOSPADM

## 2022-10-19 RX ORDER — HYDROCODONE BITARTRATE AND ACETAMINOPHEN 5; 325 MG/1; MG/1
1 TABLET ORAL EVERY 4 HOURS PRN
Status: DISCONTINUED | OUTPATIENT
Start: 2022-10-19 | End: 2022-10-20 | Stop reason: HOSPADM

## 2022-10-19 RX ORDER — ONDANSETRON 2 MG/ML
4 INJECTION INTRAMUSCULAR; INTRAVENOUS ONCE AS NEEDED
Status: COMPLETED | OUTPATIENT
Start: 2022-10-19 | End: 2022-10-19

## 2022-10-19 RX ORDER — CALCIUM CARBONATE 200(500)MG
3 TABLET,CHEWABLE ORAL 3 TIMES DAILY PRN
Status: DISCONTINUED | OUTPATIENT
Start: 2022-10-19 | End: 2022-10-19

## 2022-10-19 RX ORDER — TRISODIUM CITRATE DIHYDRATE AND CITRIC ACID MONOHYDRATE 500; 334 MG/5ML; MG/5ML
30 SOLUTION ORAL ONCE
Status: DISCONTINUED | OUTPATIENT
Start: 2022-10-19 | End: 2022-10-19 | Stop reason: HOSPADM

## 2022-10-19 RX ORDER — ONDANSETRON 2 MG/ML
4 INJECTION INTRAMUSCULAR; INTRAVENOUS EVERY 6 HOURS PRN
Status: DISCONTINUED | OUTPATIENT
Start: 2022-10-19 | End: 2022-10-20 | Stop reason: HOSPADM

## 2022-10-19 RX ORDER — SODIUM CHLORIDE 0.9 % (FLUSH) 0.9 %
1-10 SYRINGE (ML) INJECTION AS NEEDED
Status: DISCONTINUED | OUTPATIENT
Start: 2022-10-19 | End: 2022-10-20 | Stop reason: HOSPADM

## 2022-10-19 RX ORDER — HYDROCORTISONE 25 MG/G
1 CREAM TOPICAL AS NEEDED
Status: DISCONTINUED | OUTPATIENT
Start: 2022-10-19 | End: 2022-10-20

## 2022-10-19 RX ORDER — PRENATAL VIT/IRON FUM/FOLIC AC 27MG-0.8MG
1 TABLET ORAL DAILY
Status: DISCONTINUED | OUTPATIENT
Start: 2022-10-20 | End: 2022-10-20 | Stop reason: HOSPADM

## 2022-10-19 RX ORDER — EPHEDRINE SULFATE 5 MG/ML
5 INJECTION INTRAVENOUS
Status: DISCONTINUED | OUTPATIENT
Start: 2022-10-19 | End: 2022-10-19 | Stop reason: HOSPADM

## 2022-10-19 RX ORDER — MISOPROSTOL 100 MCG
25 TABLET ORAL ONCE
Status: COMPLETED | OUTPATIENT
Start: 2022-10-19 | End: 2022-10-19

## 2022-10-19 RX ORDER — BISACODYL 10 MG
10 SUPPOSITORY, RECTAL RECTAL DAILY PRN
Status: DISCONTINUED | OUTPATIENT
Start: 2022-10-20 | End: 2022-10-20 | Stop reason: HOSPADM

## 2022-10-19 RX ORDER — ONDANSETRON 4 MG/1
4 TABLET, FILM COATED ORAL EVERY 8 HOURS PRN
Status: DISCONTINUED | OUTPATIENT
Start: 2022-10-19 | End: 2022-10-20 | Stop reason: HOSPADM

## 2022-10-19 RX ORDER — IBUPROFEN 600 MG/1
600 TABLET ORAL EVERY 6 HOURS PRN
Status: DISCONTINUED | OUTPATIENT
Start: 2022-10-19 | End: 2022-10-20 | Stop reason: HOSPADM

## 2022-10-19 RX ORDER — FENTANYL CITRATE 50 UG/ML
INJECTION, SOLUTION INTRAMUSCULAR; INTRAVENOUS AS NEEDED
Status: DISCONTINUED | OUTPATIENT
Start: 2022-10-19 | End: 2022-10-20

## 2022-10-19 RX ORDER — FENTANYL CITRATE 50 UG/ML
INJECTION, SOLUTION INTRAMUSCULAR; INTRAVENOUS
Status: COMPLETED
Start: 2022-10-19 | End: 2022-10-19

## 2022-10-19 RX ORDER — HYDROCODONE BITARTRATE AND ACETAMINOPHEN 7.5; 325 MG/1; MG/1
1 TABLET ORAL EVERY 4 HOURS PRN
Status: DISCONTINUED | OUTPATIENT
Start: 2022-10-19 | End: 2022-10-20 | Stop reason: HOSPADM

## 2022-10-19 RX ORDER — PROMETHAZINE HYDROCHLORIDE 25 MG/1
25 TABLET ORAL EVERY 6 HOURS PRN
Status: DISCONTINUED | OUTPATIENT
Start: 2022-10-19 | End: 2022-10-20 | Stop reason: HOSPADM

## 2022-10-19 RX ADMIN — FENTANYL CITRATE 75 MCG: 50 INJECTION, SOLUTION INTRAMUSCULAR; INTRAVENOUS at 08:26

## 2022-10-19 RX ADMIN — SODIUM CHLORIDE, POTASSIUM CHLORIDE, SODIUM LACTATE AND CALCIUM CHLORIDE 125 ML/HR: 600; 310; 30; 20 INJECTION, SOLUTION INTRAVENOUS at 10:03

## 2022-10-19 RX ADMIN — PENICILLIN G 3 MILLION UNITS: 3000000 INJECTION, SOLUTION INTRAVENOUS at 02:30

## 2022-10-19 RX ADMIN — Medication 14 ML/HR: at 08:34

## 2022-10-19 RX ADMIN — FENTANYL CITRATE 25 MCG: 50 INJECTION, SOLUTION INTRAMUSCULAR; INTRAVENOUS at 08:21

## 2022-10-19 RX ADMIN — IBUPROFEN 600 MG: 600 TABLET ORAL at 20:23

## 2022-10-19 RX ADMIN — METHYLERGONOVINE MALEATE 200 MCG: 0.2 INJECTION, SOLUTION INTRAMUSCULAR; INTRAVENOUS at 14:40

## 2022-10-19 RX ADMIN — FAMOTIDINE 20 MG: 10 INJECTION INTRAVENOUS at 00:50

## 2022-10-19 RX ADMIN — PENICILLIN G 3 MILLION UNITS: 3000000 INJECTION, SOLUTION INTRAVENOUS at 05:59

## 2022-10-19 RX ADMIN — FAMOTIDINE 20 MG: 10 INJECTION INTRAVENOUS at 10:50

## 2022-10-19 RX ADMIN — SODIUM CHLORIDE, POTASSIUM CHLORIDE, SODIUM LACTATE AND CALCIUM CHLORIDE 1000 ML: 600; 310; 30; 20 INJECTION, SOLUTION INTRAVENOUS at 08:15

## 2022-10-19 RX ADMIN — MISOPROSTOL 25 MCG: 100 TABLET ORAL at 03:29

## 2022-10-19 RX ADMIN — Medication: at 20:23

## 2022-10-19 RX ADMIN — CALCIUM CARBONATE (ANTACID) CHEW TAB 500 MG 3 TABLET: 500 CHEW TAB at 00:50

## 2022-10-19 RX ADMIN — ONDANSETRON 4 MG: 2 INJECTION INTRAMUSCULAR; INTRAVENOUS at 13:34

## 2022-10-19 RX ADMIN — PENICILLIN G 3 MILLION UNITS: 3000000 INJECTION, SOLUTION INTRAVENOUS at 10:03

## 2022-10-19 NOTE — NON STRESS TEST
Linette Hanley, a  at 39w0d with an UGO of 10/25/2022, by Ultrasound, was seen at [facility] for a nonstress test.    Chief Complaint   Patient presents with   • Scheduled Induction       Patient Active Problem List   Diagnosis   • Severe episode of recurrent major depressive disorder, without psychotic features (HCC)   • Anxiety   • Tobacco abuse   • Short interval between pregnancies affecting pregnancy, antepartum   • GDM, class A2   • Pregnancy       Start Time:   Stop Time:     Interpretation A  Nonstress Test Interpretation A: Reactive

## 2022-10-19 NOTE — PROGRESS NOTES
Shelton Hanley  : 1990  MRN: [id number]  CSN: [id number]    Labor progress note    She reports comfortable with epidural    Min/max vitals past 24 hours:  Temp  Min: 97.8 °F (36.6 °C)  Max: 98.9 °F (37.2 °C)   BP  Min: 106/50  Max: 138/74   Pulse  Min: 73  Max: 115   Resp  Min: 16  Max: 18        FHT's: reassuring and category 1   Cervix: was checked (by me): 7 cm / 80 % / -3   Contractions: regular every 4 minutes   Assessment:  1. IUP at 39w1d  2. Fetal status reassuring   3. Progressing in labor    Plan:  1. Continue with induction  2. ABx for GBS prophylaxis     Trinidad Rangel M.D.  10/19/2022  12:56 EDT

## 2022-10-19 NOTE — H&P
[facility]  Linette Hanley  : 1990  MRN: [id number]  CSN: [id number]    History and Physical  Linette Hanley is a 31 y.o. year old  with an Estimated Date of Delivery: 10/25/22 presenting for induction of labor for favorable cervix at term.  The patient was seen in the office and was noted to be 3 to 4 cm.  She was scheduled for induction of labor as noted.  Patient's GBS was positive.  She has received antibiotics throughout the night and received 1 dose of Cytotec.  Her pelvis is proven to 9 pounds 10 ounces.  Patient had her last delivery in November of last year.  She reports pushing 4 times.    Risks of labor induction including prolongation of labor, increased risks for both  section and operative vaginal birth have been discussed at length.     Prenatal care has been with [facility].  It has been complicated by GDM - A2, LGA with last scan for growth  with infant 3108 grams, HCV ab+ but RNA negative, GBS +.  Glucose level this am 101.    History  OB History    Para Term  AB Living   3 2 2 0 0 2   SAB IAB Ectopic Molar Multiple Live Births   0 0 0 0 0 2      # Outcome Date GA Lbr Ezio/2nd Weight Sex Delivery Anes PTL Lv   3 Current            2 Term 21 40w0d  3459 g (7 lb 10 oz) M Vag-Spont   RASHAD   1 Term 09 40w0d  4366 g (9 lb 10 oz) M Vag-Spont  N RASHAD     Past Medical History:   Diagnosis Date   • Abnormal Pap smear of cervix    • Asthma    • Bipolar depression (HCC)    • Chlamydia 2019   • Depression    • Hepatitis C antibody positive in blood 2019   • Hypertension    • Migraine      No current facility-administered medications on file prior to encounter.     Current Outpatient Medications on File Prior to Encounter   Medication Sig Dispense Refill   • famotidine (Pepcid) 20 MG tablet Take 1 tablet by mouth 2 (Two) Times a Day As Needed for Heartburn. 60 tablet 2   • glyburide (DIAbeta) 2.5 MG tablet Take 1 tablet by mouth Daily With  Breakfast. 30 tablet 6   • Prenatal Vit-Fe Fumarate-FA (prenatal vitamin 27-0.8) 27-0.8 MG tablet tablet Take 1 tablet by mouth Daily.     • glucose blood test strip Use as instructed 100 each 0   • glucose monitor monitoring kit 1 each As Needed (QID testing). 1 each 0   • Lancet Devices misc 1 each 4 (Four) Times a Day. 100 each 0     Allergies   Allergen Reactions   • Latex Swelling   • Adhesive Tape Rash     Past Surgical History:   Procedure Laterality Date   • ADENOIDECTOMY       Family History   Problem Relation Age of Onset   • Cancer Mother    • Hyperlipidemia Mother    • Hypertension Mother    • Obesity Mother    • Stroke Father    • Hypertension Father    • Heart disease Father    • Depression Sister    • Depression Brother    • Cancer Maternal Grandmother    • Hyperlipidemia Maternal Grandmother    • Hypertension Maternal Grandmother    • Obesity Maternal Grandmother    • Cancer Maternal Grandfather    • Hyperlipidemia Maternal Grandfather    • Hypertension Maternal Grandfather    • Obesity Maternal Grandfather    • Depression Sister    • Depression Sister    • Depression Brother    • Depression Brother      Social History     Socioeconomic History   • Marital status: Single   Tobacco Use   • Smoking status: Every Day     Packs/day: 0.50     Types: Cigarettes   • Smokeless tobacco: Never   Vaping Use   • Vaping Use: Never used   Substance and Sexual Activity   • Alcohol use: No   • Drug use: Yes     Types: Marijuana   • Sexual activity: Not Currently     Partners: Female, Male     Review of Systems  The following systems were reviewed and negative:  constitution, eyes, ENT, respiratory, cardiovascular, gastrointestinal, genitourinary, integument, breast, hematologic / lymphatic, musculoskeletal, neurological, behavioral/psych, endocrine and allergies / immunologic    Objective  Vitals:    10/19/22 0334 10/19/22 0400 10/19/22 0500 10/19/22 0600   BP: 126/80 106/50 126/72 126/49   Pulse: 92 91 95 92    Resp:  18  18   Temp:    98.1 °F (36.7 °C)   TempSrc:    Infrared   SpO2:       Weight:         Physical Exam:  General Appearance:  Alert and cooperative, not in any acute distress.   Head:  Atraumatic and normocephalic, without obvious abnormality.   Eyes:          PERRLA, conjunctivae and sclerae normal, no Icterus. No pallor. Extraocular movements are within normal limits.   Ears:  Ears appear intact with no abnormalities noted.   Throat: No oral lesions, no thrush, oral mucosa moist.   Neck: Supple, trachea midline, no thyromegaly, no carotid bruit.   Back:   No kyphoscoliosis present. No tenderness to palpation,   range of motion normal.  No CVAT.   Lungs:   Chest shape is normal. Breath sounds heard bilaterally equally.  No crackles or wheezing. No Pleural rub or bronchial breathing. Normal respiratory effort.   Heart:  Normal S1 and S2, no murmur, no gallop, no rub. No JVD.   Abdomen:   Normal bowel sounds, no masses, no hepatomegaly, no splenomegaly. Soft, non-tender, no guarding, no rebound tenderness.  Gravid uterus.   Extremities: Moves all extremities well, no edema, no cyanosis, no clubbing.  Normal range of motion. Normal strength and tone.   Skin: No bleeding, bruising or rash. No palpable masses noted or induration.   Psychiatric: Alert and oriented  to time, place, and person. Appropriate mood and affect. Thought content organized and appropriate judgment.       FHT's: reassuring and category 1   Contractions: none   Cervix: was checked (by me): 4 cm / 50 % / -3; AROM clear fluid   Presentation: cephalic       Prenatal Labs  Lab Results   Component Value Date    HGB 12.1 10/18/2022    HEPBSAG Negative 07/29/2022    ABORH O Positive 11/17/2021    ABSCRN Negative 10/18/2022    [id number] Non Reactive 07/29/2022    HEPCVIRUSABY 1.0 (H) 07/29/2022    STREPGPB Positive (A) 09/26/2022       Current Labs Reviewed    I reviewed the patient's new clinical results.     Lab Results (last 24 hours)      Procedure Component Value Units Date/Time    POC Glucose Once [[id number]]  (Normal) Collected: 10/19/22 0710    Specimen: Blood Updated: 10/19/22 0712     Glucose 101 mg/dL      Comment: Serial Number: [id number]:  [id number]       COVID PRE-OP / PRE-PROCEDURE SCREENING ORDER (NO ISOLATION) - Swab, Nasal Cavity [[id number]]  (Normal) Collected: 10/18/22 2106    Specimen: Swab from Nasal Cavity Updated: 10/18/22 2207    Narrative:      The following orders were created for panel order COVID PRE-OP / PRE-PROCEDURE SCREENING ORDER (NO ISOLATION) - Swab, Nasal Cavity.  Procedure                               Abnormality         Status                     ---------                               -----------         ------                     COVID-19,[facility] IN-ZEUS...[[id number]]  Normal              Final result                 Please view results for these tests on the individual orders.    COVID-19,[facility] IN-HOUSE,Nasal Swab No Transport Media 3-4 HR TAT - Swab, Nasal Cavity [[id number]]  (Normal) Collected: 10/18/22 2106    Specimen: Swab from Nasal Cavity Updated: 10/18/22 2207     COVID19 Not Detected    Narrative:      Fact sheet for providers: https://www.fda.gov/media/575198/download     Fact sheet for patients: https://www.fda.gov/media/124307/download    Test performed by PCR.    Consider negative results in combination with clinical observations, patient history, and epidemiological information.    CBC (No Diff) [[id number]]  (Abnormal) Collected: 10/18/22 2124    Specimen: Blood Updated: 10/18/22 2134     WBC 14.21 10*3/mm3      RBC 4.10 10*6/mm3      Hemoglobin 12.1 g/dL      Hematocrit 36.0 %      MCV 87.8 fL      MCH 29.5 pg      MCHC 33.6 g/dL      RDW 14.1 %      RDW-SD 45.0 fl      MPV 10.2 fL      Platelets 392 10*3/mm3     POC Urinalysis Dipstick [[id number]]  (Abnormal) Collected: 10/18/22 2020    Specimen: Urine Updated: 10/18/22 2021     Color Dark Yellow     Clarity, UA Clear     Glucose, UA  Negative mg/dL      Bilirubin Negative     Ketones, UA Trace     Specific Gravity  1.010     Blood, UA Trace     pH, Urine 5.0     Protein, POC Negative mg/dL      Urobilinogen, UA Normal     Leukocytes Negative     Nitrite, UA Negative          Assessment   1. IUP with an Estimated Date of Delivery: 10/25/22  2. Induction of labor because of favorable cervix at term  3. Group B strep status: positive  4. GDM A2  5. LGA  6. Hep C AB+; RNA negative     Plan   1. AROM this am as noted; will also start pitocin given no contractions and GBS+.  2. Continue antibiotics.  3. Epidural when needed.  4. Pt has been counseled regarding LGA with diabetes and potential for shoulder dystocia.  5. Monitor glucose levels as noted.    Trinidad Rangel M.D.  10/19/2022  07:48 EDT

## 2022-10-19 NOTE — L&D DELIVERY NOTE
[facility]  Vaginal Delivery Note    Delivery details     Delivery: Vaginal, Spontaneous     YOB: 2020    Time of Birth: 2:13 PM      Anesthesia: Epidural     Delivering clinician:  Trinidad Rangel M.D.   Forceps?   No   Vacuum? No    Shoulder dystocia present: No      Delivery narrative:  The patient progressed to complete and pushing.  With good maternal effort she delivered a viable male infant.  The head presented in the OA presentation.  There was no nuchal cord present.  The anterior fetal shoulder was then easily delivered with a gentle downward motion followed by the posterior fetal shoulder, followed by the remainder of the infant without difficulty.  The infant was immediately vigorous.  The oropharynx and nares were bulb-suction.  The infant was placed on the maternal abdomen and the cord was clamped roughly 45 seconds following delivery.  Cord blood was then collected.  The placenta then delivered spontaneously intact, and a three vessel cord was noted.  Uterine massage and Pitocin 20 units IV was given until the fundus was firm. The patient was also given IM methergine x 1 given macrosomia as patient was noted to have mild uterine atony.   The cervix, vagina, perineum, and rectum were carefully inspected for lacerations.  No lacerations were noted.   Counts for needles, sponges, laps and instruments were correct at the end of the delivery.  There were no major complications.  Mother and baby were stable at the end of the delivery.    Infant details    Findings: male  infant     Infant observations: Weight: 4009 g (8 lb 13.4 oz)   Length: 21.5  in   Apgars: 7  @ 1 minute /    8  @ 5 minutes     Placenta, Cord, and Fluid    Placenta delivered  Spontaneous  at   10/19/2022  2:37 PM         Nuchal Cord?  no   Cord blood obtained: yes     Repair    Episiotomy: None    Lacerations: No   Estimated Blood Loss:  300 ml       Complications  none    Disposition  Mother to Remain in LD  in stable  condition currently.  Baby to remains with mom  in stable condition currently.      Trinidad Rangel MD  10/19/22  16:01 EDT

## 2022-10-19 NOTE — NURSING NOTE
"2033) CADEN Kincaid CNM notified of pt's arrival and pt's verbalizations that she is angry and upset that she isn't going to have her water broken upon arrival and an epidural given on arrival. Pt states she was told that was what she would get. This RN explained to patient that she is GBS + and that antibiotics would be given prophylactically prior to AROM, pt states she is \"pissed off\" and wants to \"cuss out that doctor\". Pt states that isn't how they did it at  last year. Orders received from Shantell BERG to start Pencillin per protocol then give one dose of Cytotec 25mcg vaginally 4 hours after loading dose of Pencillin is completed. This POC was given to the patient and pt agreed to allow this.   "

## 2022-10-20 VITALS
HEART RATE: 83 BPM | BODY MASS INDEX: 47.14 KG/M2 | RESPIRATION RATE: 16 BRPM | SYSTOLIC BLOOD PRESSURE: 123 MMHG | TEMPERATURE: 98.4 F | OXYGEN SATURATION: 98 % | WEIGHT: 293 LBS | DIASTOLIC BLOOD PRESSURE: 81 MMHG

## 2022-10-20 LAB
BASOPHILS # BLD AUTO: 0.05 10*3/MM3 (ref 0–0.2)
BASOPHILS NFR BLD AUTO: 0.4 % (ref 0–1.5)
DEPRECATED RDW RBC AUTO: 45.1 FL (ref 37–54)
EOSINOPHIL # BLD AUTO: 0.13 10*3/MM3 (ref 0–0.4)
EOSINOPHIL NFR BLD AUTO: 1 % (ref 0.3–6.2)
ERYTHROCYTE [DISTWIDTH] IN BLOOD BY AUTOMATED COUNT: 14.2 % (ref 12.3–15.4)
HCT VFR BLD AUTO: 34.1 % (ref 34–46.6)
HGB BLD-MCNC: 11.4 G/DL (ref 12–15.9)
IMM GRANULOCYTES # BLD AUTO: 0.1 10*3/MM3 (ref 0–0.05)
IMM GRANULOCYTES NFR BLD AUTO: 0.8 % (ref 0–0.5)
LYMPHOCYTES # BLD AUTO: 3.06 10*3/MM3 (ref 0.7–3.1)
LYMPHOCYTES NFR BLD AUTO: 24.5 % (ref 19.6–45.3)
MCH RBC QN AUTO: 29.2 PG (ref 26.6–33)
MCHC RBC AUTO-ENTMCNC: 33.4 G/DL (ref 31.5–35.7)
MCV RBC AUTO: 87.4 FL (ref 79–97)
MONOCYTES # BLD AUTO: 1.03 10*3/MM3 (ref 0.1–0.9)
MONOCYTES NFR BLD AUTO: 8.3 % (ref 5–12)
NEUTROPHILS NFR BLD AUTO: 65 % (ref 42.7–76)
NEUTROPHILS NFR BLD AUTO: 8.11 10*3/MM3 (ref 1.7–7)
NRBC BLD AUTO-RTO: 0 /100 WBC (ref 0–0.2)
PLATELET # BLD AUTO: 340 10*3/MM3 (ref 140–450)
PMV BLD AUTO: 10.3 FL (ref 6–12)
RBC # BLD AUTO: 3.9 10*6/MM3 (ref 3.77–5.28)
WBC NRBC COR # BLD: 12.48 10*3/MM3 (ref 3.4–10.8)

## 2022-10-20 PROCEDURE — 25010000002 MEASLES, MUMPS & RUBELLA VAC RECONSTITUTED SOLUTION: Performed by: MIDWIFE

## 2022-10-20 PROCEDURE — 0503F POSTPARTUM CARE VISIT: CPT | Performed by: MIDWIFE

## 2022-10-20 PROCEDURE — 85025 COMPLETE CBC W/AUTO DIFF WBC: CPT | Performed by: OBSTETRICS & GYNECOLOGY

## 2022-10-20 PROCEDURE — 25010000002 TETANUS-DIPHTH-ACELL PERTUSSIS 5-2.5-18.5 LF-MCG/0.5 SUSPENSION PREFILLED SYRINGE: Performed by: MIDWIFE

## 2022-10-20 PROCEDURE — 90472 IMMUNIZATION ADMIN EACH ADD: CPT | Performed by: MIDWIFE

## 2022-10-20 PROCEDURE — 90707 MMR VACCINE SC: CPT | Performed by: MIDWIFE

## 2022-10-20 PROCEDURE — 90715 TDAP VACCINE 7 YRS/> IM: CPT | Performed by: MIDWIFE

## 2022-10-20 PROCEDURE — 90471 IMMUNIZATION ADMIN: CPT | Performed by: MIDWIFE

## 2022-10-20 RX ORDER — IBUPROFEN 600 MG/1
600 TABLET ORAL EVERY 6 HOURS PRN
Qty: 60 TABLET | Refills: 0 | Status: SHIPPED | OUTPATIENT
Start: 2022-10-20

## 2022-10-20 RX ADMIN — TETANUS TOXOID, REDUCED DIPHTHERIA TOXOID AND ACELLULAR PERTUSSIS VACCINE, ADSORBED 0.5 ML: 5; 2.5; 8; 8; 2.5 SUSPENSION INTRAMUSCULAR at 14:59

## 2022-10-20 RX ADMIN — IBUPROFEN 600 MG: 600 TABLET ORAL at 03:16

## 2022-10-20 RX ADMIN — PRENATAL VITAMINS-IRON FUMARATE 27 MG IRON-FOLIC ACID 0.8 MG TABLET 1 TABLET: at 09:33

## 2022-10-20 RX ADMIN — MEASLES, MUMPS, AND RUBELLA VIRUS VACCINE LIVE 0.5 ML: 1000; 12500; 1000 INJECTION, POWDER, LYOPHILIZED, FOR SUSPENSION SUBCUTANEOUS at 14:58

## 2022-10-20 NOTE — DISCHARGE SUMMARY
Discharge Summary     Shelton Hanley  : 1990  MRN: [id number]  CSN: [id number]    Date of Admission: 10/18/2022   Date of Discharge:  10/20/2022   Delivering Physician: Trinidad Rangel        Admission Diagnosis: 1. Pregnancy [Z34.90]   Discharge Diagnosis: 1. Same as above plus  2. Pregnancy at 39w1d - delivered       Procedures: 10/19/2022  - Vaginal, Spontaneous       Hospital Course  Patient is a 31 y.o.  who at 39w1d had a vaginal birth.  Her postpartum course was without complications.  On PPD #1 she was ready for discharge.  She had normal lochia and pain well controlled with oral medications. She is bottle feeding.    Infant  male  fetus weighing 4009 g (8 lb 13.4 oz)   Apgars -  7 @ 1 minute /  8 @ 5 minutes.    Discharge labs  Lab Results   Component Value Date    WBC 12.48 (H) 10/20/2022    HGB 11.4 (L) 10/20/2022    HCT 34.1 10/20/2022     10/20/2022       Discharge Medications     Discharge Medications      New Medications      Instructions Start Date   ibuprofen 600 MG tablet  Commonly known as: ADVIL,MOTRIN   600 mg, Oral, Every 6 Hours PRN         Continue These Medications      Instructions Start Date   famotidine 20 MG tablet  Commonly known as: Pepcid   20 mg, Oral, 2 Times Daily PRN      glucose monitor monitoring kit   1 each, Does not apply, As Needed      Lancet Devices misc   1 each, Does not apply, 4 Times Daily      prenatal vitamin 27-0.8 27-0.8 MG tablet tablet   1 tablet, Oral, Daily         Stop These Medications    glucose blood test strip     glyburide 2.5 MG tablet  Commonly known as: DIAbeta            Discharge Disposition stable   Condition on Discharge: good   Follow-up: 6 weeks with JUAN Peoples     Time spent on discharge: 30 minutes or less  RUFUS Hebert  10/20/2022

## 2022-10-20 NOTE — PLAN OF CARE
Goal Outcome Evaluation:  Plan of Care Reviewed With: patient        Progress: improving  Outcome Evaluation: VSS ADQ I&O Bleeding WNL pain controlled with meds + bonding